# Patient Record
Sex: MALE | Race: OTHER | NOT HISPANIC OR LATINO
[De-identification: names, ages, dates, MRNs, and addresses within clinical notes are randomized per-mention and may not be internally consistent; named-entity substitution may affect disease eponyms.]

---

## 2022-06-23 PROBLEM — Z00.00 ENCOUNTER FOR PREVENTIVE HEALTH EXAMINATION: Status: ACTIVE | Noted: 2022-06-23

## 2022-06-30 ENCOUNTER — NON-APPOINTMENT (OUTPATIENT)
Age: 72
End: 2022-06-30

## 2022-07-06 ENCOUNTER — TRANSCRIPTION ENCOUNTER (OUTPATIENT)
Age: 72
End: 2022-07-06

## 2022-07-06 ENCOUNTER — INPATIENT (INPATIENT)
Facility: HOSPITAL | Age: 72
LOS: 5 days | Discharge: HOME HEALTH | DRG: 233 | End: 2022-07-12
Attending: THORACIC SURGERY (CARDIOTHORACIC VASCULAR SURGERY) | Admitting: THORACIC SURGERY (CARDIOTHORACIC VASCULAR SURGERY)
Payer: COMMERCIAL

## 2022-07-06 VITALS
OXYGEN SATURATION: 98 % | WEIGHT: 158.95 LBS | DIASTOLIC BLOOD PRESSURE: 67 MMHG | HEART RATE: 104 BPM | SYSTOLIC BLOOD PRESSURE: 135 MMHG | HEIGHT: 65 IN | RESPIRATION RATE: 18 BRPM | TEMPERATURE: 98 F

## 2022-07-06 DIAGNOSIS — I25.10 ATHEROSCLEROTIC HEART DISEASE OF NATIVE CORONARY ARTERY WITHOUT ANGINA PECTORIS: ICD-10-CM

## 2022-07-06 DIAGNOSIS — I10 ESSENTIAL (PRIMARY) HYPERTENSION: ICD-10-CM

## 2022-07-06 DIAGNOSIS — E11.9 TYPE 2 DIABETES MELLITUS WITHOUT COMPLICATIONS: ICD-10-CM

## 2022-07-06 DIAGNOSIS — R94.39 ABNORMAL RESULT OF OTHER CARDIOVASCULAR FUNCTION STUDY: ICD-10-CM

## 2022-07-06 LAB
A1C WITH ESTIMATED AVERAGE GLUCOSE RESULT: 6 % — HIGH (ref 4–5.6)
ALBUMIN SERPL ELPH-MCNC: 4.4 G/DL — SIGNIFICANT CHANGE UP (ref 3.3–5)
ALP SERPL-CCNC: 76 U/L — SIGNIFICANT CHANGE UP (ref 40–120)
ALT FLD-CCNC: 22 U/L — SIGNIFICANT CHANGE UP (ref 10–45)
ANION GAP SERPL CALC-SCNC: 10 MMOL/L — SIGNIFICANT CHANGE UP (ref 5–17)
ANION GAP SERPL CALC-SCNC: 11 MMOL/L — SIGNIFICANT CHANGE UP (ref 5–17)
APPEARANCE UR: CLEAR — SIGNIFICANT CHANGE UP
APTT BLD: 119.1 SEC — HIGH (ref 27.5–35.5)
APTT BLD: 92.6 SEC — HIGH (ref 27.5–35.5)
AST SERPL-CCNC: 16 U/L — SIGNIFICANT CHANGE UP (ref 10–40)
BILIRUB DIRECT SERPL-MCNC: <0.1 MG/DL — SIGNIFICANT CHANGE UP (ref 0–0.3)
BILIRUB INDIRECT FLD-MCNC: >0.2 MG/DL — SIGNIFICANT CHANGE UP (ref 0.2–1)
BILIRUB SERPL-MCNC: 0.3 MG/DL — SIGNIFICANT CHANGE UP (ref 0.2–1.2)
BILIRUB UR-MCNC: NEGATIVE — SIGNIFICANT CHANGE UP
BLD GP AB SCN SERPL QL: NEGATIVE — SIGNIFICANT CHANGE UP
BUN SERPL-MCNC: 19 MG/DL — SIGNIFICANT CHANGE UP (ref 7–23)
BUN SERPL-MCNC: 20 MG/DL — SIGNIFICANT CHANGE UP (ref 7–23)
CALCIUM SERPL-MCNC: 8.9 MG/DL — SIGNIFICANT CHANGE UP (ref 8.4–10.5)
CALCIUM SERPL-MCNC: 9.2 MG/DL — SIGNIFICANT CHANGE UP (ref 8.4–10.5)
CHLORIDE SERPL-SCNC: 103 MMOL/L — SIGNIFICANT CHANGE UP (ref 96–108)
CHLORIDE SERPL-SCNC: 103 MMOL/L — SIGNIFICANT CHANGE UP (ref 96–108)
CHOLEST SERPL-MCNC: 129 MG/DL — SIGNIFICANT CHANGE UP
CO2 SERPL-SCNC: 24 MMOL/L — SIGNIFICANT CHANGE UP (ref 22–31)
CO2 SERPL-SCNC: 26 MMOL/L — SIGNIFICANT CHANGE UP (ref 22–31)
COLOR SPEC: SIGNIFICANT CHANGE UP
CREAT SERPL-MCNC: 0.94 MG/DL — SIGNIFICANT CHANGE UP (ref 0.5–1.3)
CREAT SERPL-MCNC: 1.02 MG/DL — SIGNIFICANT CHANGE UP (ref 0.5–1.3)
DIFF PNL FLD: NEGATIVE — SIGNIFICANT CHANGE UP
EGFR: 79 ML/MIN/1.73M2 — SIGNIFICANT CHANGE UP
EGFR: 87 ML/MIN/1.73M2 — SIGNIFICANT CHANGE UP
ESTIMATED AVERAGE GLUCOSE: 126 MG/DL — HIGH (ref 68–114)
FIBRINOGEN PPP-MCNC: 424 MG/DL — SIGNIFICANT CHANGE UP (ref 330–520)
FIBRINOGEN PPP-MCNC: 464 MG/DL — SIGNIFICANT CHANGE UP (ref 330–520)
GLUCOSE BLDC GLUCOMTR-MCNC: 134 MG/DL — HIGH (ref 70–99)
GLUCOSE BLDC GLUCOMTR-MCNC: 92 MG/DL — SIGNIFICANT CHANGE UP (ref 70–99)
GLUCOSE BLDC GLUCOMTR-MCNC: 99 MG/DL — SIGNIFICANT CHANGE UP (ref 70–99)
GLUCOSE SERPL-MCNC: 122 MG/DL — HIGH (ref 70–99)
GLUCOSE SERPL-MCNC: 143 MG/DL — HIGH (ref 70–99)
GLUCOSE UR QL: NEGATIVE — SIGNIFICANT CHANGE UP
HCT VFR BLD CALC: 39.5 % — SIGNIFICANT CHANGE UP (ref 39–50)
HDLC SERPL-MCNC: 42 MG/DL — SIGNIFICANT CHANGE UP
HGB BLD-MCNC: 12.9 G/DL — LOW (ref 13–17)
INR BLD: 1.15 RATIO — SIGNIFICANT CHANGE UP (ref 0.88–1.16)
KETONES UR-MCNC: NEGATIVE — SIGNIFICANT CHANGE UP
LEUKOCYTE ESTERASE UR-ACNC: NEGATIVE — SIGNIFICANT CHANGE UP
LIPID PNL WITH DIRECT LDL SERPL: 80 MG/DL — SIGNIFICANT CHANGE UP
MAGNESIUM SERPL-MCNC: 1.9 MG/DL — SIGNIFICANT CHANGE UP (ref 1.6–2.6)
MCHC RBC-ENTMCNC: 27.2 PG — SIGNIFICANT CHANGE UP (ref 27–34)
MCHC RBC-ENTMCNC: 32.7 GM/DL — SIGNIFICANT CHANGE UP (ref 32–36)
MCV RBC AUTO: 83.3 FL — SIGNIFICANT CHANGE UP (ref 80–100)
NITRITE UR-MCNC: NEGATIVE — SIGNIFICANT CHANGE UP
NON HDL CHOLESTEROL: 86 MG/DL — SIGNIFICANT CHANGE UP
NRBC # BLD: 0 /100 WBCS — SIGNIFICANT CHANGE UP (ref 0–0)
NT-PROBNP SERPL-SCNC: 103 PG/ML — SIGNIFICANT CHANGE UP (ref 0–300)
PA ADP PRP-ACNC: 298 PRU — SIGNIFICANT CHANGE UP (ref 194–417)
PH UR: 6.5 — SIGNIFICANT CHANGE UP (ref 5–8)
PHOSPHATE SERPL-MCNC: 2.9 MG/DL — SIGNIFICANT CHANGE UP (ref 2.5–4.5)
PLATELET # BLD AUTO: 274 K/UL — SIGNIFICANT CHANGE UP (ref 150–400)
POTASSIUM SERPL-MCNC: 4.6 MMOL/L — SIGNIFICANT CHANGE UP (ref 3.5–5.3)
POTASSIUM SERPL-MCNC: 4.7 MMOL/L — SIGNIFICANT CHANGE UP (ref 3.5–5.3)
POTASSIUM SERPL-SCNC: 4.6 MMOL/L — SIGNIFICANT CHANGE UP (ref 3.5–5.3)
POTASSIUM SERPL-SCNC: 4.7 MMOL/L — SIGNIFICANT CHANGE UP (ref 3.5–5.3)
PROT SERPL-MCNC: 7.1 G/DL — SIGNIFICANT CHANGE UP (ref 6–8.3)
PROT UR-MCNC: NEGATIVE — SIGNIFICANT CHANGE UP
PROTHROM AB SERPL-ACNC: 13.3 SEC — SIGNIFICANT CHANGE UP (ref 10.5–13.4)
RBC # BLD: 4.74 M/UL — SIGNIFICANT CHANGE UP (ref 4.2–5.8)
RBC # FLD: 13.2 % — SIGNIFICANT CHANGE UP (ref 10.3–14.5)
RH IG SCN BLD-IMP: POSITIVE — SIGNIFICANT CHANGE UP
RH IG SCN BLD-IMP: POSITIVE — SIGNIFICANT CHANGE UP
SARS-COV-2 RNA SPEC QL NAA+PROBE: SIGNIFICANT CHANGE UP
SODIUM SERPL-SCNC: 138 MMOL/L — SIGNIFICANT CHANGE UP (ref 135–145)
SODIUM SERPL-SCNC: 139 MMOL/L — SIGNIFICANT CHANGE UP (ref 135–145)
SP GR SPEC: 1.02 — SIGNIFICANT CHANGE UP (ref 1.01–1.02)
T3 SERPL-MCNC: 108 NG/DL — SIGNIFICANT CHANGE UP (ref 80–200)
T4 AB SER-ACNC: 8.3 UG/DL — SIGNIFICANT CHANGE UP (ref 4.6–12)
TRIGL SERPL-MCNC: 30 MG/DL — SIGNIFICANT CHANGE UP
TSH SERPL-MCNC: 0.43 UIU/ML — SIGNIFICANT CHANGE UP (ref 0.27–4.2)
UROBILINOGEN FLD QL: NEGATIVE — SIGNIFICANT CHANGE UP
WBC # BLD: 7.26 K/UL — SIGNIFICANT CHANGE UP (ref 3.8–10.5)
WBC # FLD AUTO: 7.26 K/UL — SIGNIFICANT CHANGE UP (ref 3.8–10.5)

## 2022-07-06 PROCEDURE — ZZZZZ: CPT

## 2022-07-06 PROCEDURE — 99152 MOD SED SAME PHYS/QHP 5/>YRS: CPT

## 2022-07-06 PROCEDURE — 93306 TTE W/DOPPLER COMPLETE: CPT | Mod: 26

## 2022-07-06 PROCEDURE — 93010 ELECTROCARDIOGRAM REPORT: CPT

## 2022-07-06 PROCEDURE — 71045 X-RAY EXAM CHEST 1 VIEW: CPT | Mod: 26

## 2022-07-06 PROCEDURE — 93880 EXTRACRANIAL BILAT STUDY: CPT | Mod: 26

## 2022-07-06 PROCEDURE — 93454 CORONARY ARTERY ANGIO S&I: CPT | Mod: 26

## 2022-07-06 RX ORDER — CHLORHEXIDINE GLUCONATE 213 G/1000ML
1 SOLUTION TOPICAL ONCE
Refills: 0 | Status: COMPLETED | OUTPATIENT
Start: 2022-07-06 | End: 2022-07-07

## 2022-07-06 RX ORDER — SIMVASTATIN 20 MG/1
40 TABLET, FILM COATED ORAL AT BEDTIME
Refills: 0 | Status: DISCONTINUED | OUTPATIENT
Start: 2022-07-06 | End: 2022-07-06

## 2022-07-06 RX ORDER — SODIUM CHLORIDE 9 MG/ML
1000 INJECTION, SOLUTION INTRAVENOUS
Refills: 0 | Status: DISCONTINUED | OUTPATIENT
Start: 2022-07-06 | End: 2022-07-07

## 2022-07-06 RX ORDER — SIMVASTATIN 20 MG/1
20 TABLET, FILM COATED ORAL AT BEDTIME
Refills: 0 | Status: DISCONTINUED | OUTPATIENT
Start: 2022-07-06 | End: 2022-07-07

## 2022-07-06 RX ORDER — DEXTROSE 50 % IN WATER 50 %
25 SYRINGE (ML) INTRAVENOUS ONCE
Refills: 0 | Status: DISCONTINUED | OUTPATIENT
Start: 2022-07-06 | End: 2022-07-07

## 2022-07-06 RX ORDER — AMLODIPINE BESYLATE 2.5 MG/1
10 TABLET ORAL DAILY
Refills: 0 | Status: DISCONTINUED | OUTPATIENT
Start: 2022-07-06 | End: 2022-07-07

## 2022-07-06 RX ORDER — CHLORHEXIDINE GLUCONATE 213 G/1000ML
1 SOLUTION TOPICAL ONCE
Refills: 0 | Status: COMPLETED | OUTPATIENT
Start: 2022-07-06 | End: 2022-07-06

## 2022-07-06 RX ORDER — SODIUM CHLORIDE 9 MG/ML
250 INJECTION INTRAMUSCULAR; INTRAVENOUS; SUBCUTANEOUS ONCE
Refills: 0 | Status: COMPLETED | OUTPATIENT
Start: 2022-07-06 | End: 2022-07-06

## 2022-07-06 RX ORDER — INSULIN LISPRO 100/ML
VIAL (ML) SUBCUTANEOUS AT BEDTIME
Refills: 0 | Status: DISCONTINUED | OUTPATIENT
Start: 2022-07-06 | End: 2022-07-07

## 2022-07-06 RX ORDER — GABAPENTIN 400 MG/1
300 CAPSULE ORAL ONCE
Refills: 0 | Status: COMPLETED | OUTPATIENT
Start: 2022-07-06 | End: 2022-07-07

## 2022-07-06 RX ORDER — INSULIN LISPRO 100/ML
VIAL (ML) SUBCUTANEOUS
Refills: 0 | Status: DISCONTINUED | OUTPATIENT
Start: 2022-07-06 | End: 2022-07-07

## 2022-07-06 RX ORDER — SODIUM CHLORIDE 9 MG/ML
1000 INJECTION INTRAMUSCULAR; INTRAVENOUS; SUBCUTANEOUS
Refills: 0 | Status: DISCONTINUED | OUTPATIENT
Start: 2022-07-06 | End: 2022-07-09

## 2022-07-06 RX ORDER — METOPROLOL TARTRATE 50 MG
50 TABLET ORAL EVERY 12 HOURS
Refills: 0 | Status: DISCONTINUED | OUTPATIENT
Start: 2022-07-06 | End: 2022-07-07

## 2022-07-06 RX ORDER — HEPARIN SODIUM 5000 [USP'U]/ML
1000 INJECTION INTRAVENOUS; SUBCUTANEOUS
Qty: 25000 | Refills: 0 | Status: DISCONTINUED | OUTPATIENT
Start: 2022-07-06 | End: 2022-07-07

## 2022-07-06 RX ORDER — ASPIRIN/CALCIUM CARB/MAGNESIUM 324 MG
81 TABLET ORAL DAILY
Refills: 0 | Status: DISCONTINUED | OUTPATIENT
Start: 2022-07-06 | End: 2022-07-07

## 2022-07-06 RX ORDER — DEXTROSE 50 % IN WATER 50 %
15 SYRINGE (ML) INTRAVENOUS ONCE
Refills: 0 | Status: DISCONTINUED | OUTPATIENT
Start: 2022-07-06 | End: 2022-07-07

## 2022-07-06 RX ORDER — ACETAMINOPHEN 500 MG
1000 TABLET ORAL ONCE
Refills: 0 | Status: COMPLETED | OUTPATIENT
Start: 2022-07-06 | End: 2022-07-07

## 2022-07-06 RX ORDER — DEXTROSE 50 % IN WATER 50 %
12.5 SYRINGE (ML) INTRAVENOUS ONCE
Refills: 0 | Status: DISCONTINUED | OUTPATIENT
Start: 2022-07-06 | End: 2022-07-07

## 2022-07-06 RX ORDER — CHLORHEXIDINE GLUCONATE 213 G/1000ML
30 SOLUTION TOPICAL ONCE
Refills: 0 | Status: COMPLETED | OUTPATIENT
Start: 2022-07-06 | End: 2022-07-07

## 2022-07-06 RX ORDER — CEFUROXIME AXETIL 250 MG
1500 TABLET ORAL ONCE
Refills: 0 | Status: DISCONTINUED | OUTPATIENT
Start: 2022-07-06 | End: 2022-07-07

## 2022-07-06 RX ORDER — GLUCAGON INJECTION, SOLUTION 0.5 MG/.1ML
1 INJECTION, SOLUTION SUBCUTANEOUS ONCE
Refills: 0 | Status: DISCONTINUED | OUTPATIENT
Start: 2022-07-06 | End: 2022-07-07

## 2022-07-06 RX ORDER — ASCORBIC ACID 60 MG
2000 TABLET,CHEWABLE ORAL ONCE
Refills: 0 | Status: COMPLETED | OUTPATIENT
Start: 2022-07-06 | End: 2022-07-06

## 2022-07-06 RX ORDER — HEPARIN SODIUM 5000 [USP'U]/ML
5000 INJECTION INTRAVENOUS; SUBCUTANEOUS ONCE
Refills: 0 | Status: COMPLETED | OUTPATIENT
Start: 2022-07-06 | End: 2022-07-06

## 2022-07-06 RX ORDER — MUPIROCIN 20 MG/G
1 OINTMENT TOPICAL
Refills: 0 | Status: DISCONTINUED | OUTPATIENT
Start: 2022-07-06 | End: 2022-07-07

## 2022-07-06 RX ADMIN — CHLORHEXIDINE GLUCONATE 1 APPLICATION(S): 213 SOLUTION TOPICAL at 17:10

## 2022-07-06 RX ADMIN — HEPARIN SODIUM 9 UNIT(S)/HR: 5000 INJECTION INTRAVENOUS; SUBCUTANEOUS at 22:45

## 2022-07-06 RX ADMIN — HEPARIN SODIUM 10 UNIT(S)/HR: 5000 INJECTION INTRAVENOUS; SUBCUTANEOUS at 16:18

## 2022-07-06 RX ADMIN — HEPARIN SODIUM 5000 UNIT(S): 5000 INJECTION INTRAVENOUS; SUBCUTANEOUS at 16:19

## 2022-07-06 RX ADMIN — Medication 50 MILLIGRAM(S): at 16:19

## 2022-07-06 RX ADMIN — SODIUM CHLORIDE 75 MILLILITER(S): 9 INJECTION INTRAMUSCULAR; INTRAVENOUS; SUBCUTANEOUS at 10:10

## 2022-07-06 RX ADMIN — SODIUM CHLORIDE 750 MILLILITER(S): 9 INJECTION INTRAMUSCULAR; INTRAVENOUS; SUBCUTANEOUS at 08:40

## 2022-07-06 RX ADMIN — MUPIROCIN 1 APPLICATION(S): 20 OINTMENT TOPICAL at 16:19

## 2022-07-06 RX ADMIN — Medication 2000 MILLIGRAM(S): at 21:29

## 2022-07-06 RX ADMIN — Medication 81 MILLIGRAM(S): at 16:19

## 2022-07-06 RX ADMIN — SIMVASTATIN 20 MILLIGRAM(S): 20 TABLET, FILM COATED ORAL at 21:29

## 2022-07-06 NOTE — CONSULT NOTE ADULT - ASSESSMENT
This is a 72 yo male PMH DM2 A1C 5.7, former smoker 30 pack yrs, bilat carotid stenosis,  HTN, HLD who presented to cardiology, Dr. BAUDILIO Briceno, for evaluation of left shoulder pain.  Denies chest pain/pressure, SOB/FONSECA, dizziness, diaphoresis, palpitations, nausea, vomiting, peripheral edema, recent weight gain, or syncope.  No implanted monitoring devices. NST 1/8/2021 EF 61% septal and apical moderate perfusion defect, moderate inferior defect.  Pt. presents asymptomatic for further evaluation and cardiac cath.  (06 Jul 2022 08:20)  Cardiac cath done showing Multivessel disease; CTS Dr Montgomery consulted for CABG eval.     7/6: Pt seen an examined at bedside, all labs and imaging reviewed by Dr. Montgomery; Plan for CABG tomorrow in AM 7/7, preop workup in progress

## 2022-07-06 NOTE — H&P CARDIOLOGY - NSICDXPASTMEDICALHX_GEN_ALL_CORE_FT
PAST MEDICAL HISTORY:  Diabetes mellitus     Former smoker     HLD (hyperlipidemia)     HTN (hypertension)     Vitiligo

## 2022-07-06 NOTE — CONSULT NOTE ADULT - PROBLEM SELECTOR RECOMMENDATION 9
CTS Dr. Montgomery Consulted for CABG eval  Dr. Montgomery to see and evaluate patient   c/w ASA 81mg, simvastatin 20mg @bedtime, Start BB  Preop w/u in progress- preop labs ordered, Carotids, PFTs, TFTs, TTE  Lisinopril Held for OR tomorrow   OR Date: Tomorrow 7/7  NPO after midnight   Labs and imaging reviewed with Dr. Montgomery

## 2022-07-06 NOTE — CONSULT NOTE ADULT - SUBJECTIVE AND OBJECTIVE BOX
History of Present Illness:  HPI:  This is a 70 yo male PMH DM2 A1C 5.7, former smoker 30 pack yrs, bilat carotid stenosis,  HTN, HLD who presented to cardiology, Dr. BAUDILIO Briceno, for evaluation of left shoulder pain.  Denies chest pain/pressure, SOB/FONSECA, dizziness, diaphoresis, palpitations, nausea, vomiting, peripheral edema, recent weight gain, or syncope.  No implanted monitoring devices. NST 2021 EF 61% septal and apical moderate perfusion defect, moderate inferior defect.  Pt. presents asymptomatic for further evaluation and cardiac cath.  (2022 08:20)   Cardiac cath done showing Multivessel disease; CTS Dr Montgomery consulted for CABG eval.        Past Medical History  HTN (hypertension)    HLD (hyperlipidemia)    Diabetes mellitus    Former smoker    Vitiligo    Past Surgical History  No significant past surgical history    MEDICATIONS  (STANDING):  amLODIPine   Tablet 10 milliGRAM(s) Oral daily  aspirin enteric coated 81 milliGRAM(s) Oral daily  chlorhexidine 4% Liquid 1 Application(s) Topical once  dextrose 5%. 1000 milliLiter(s) (50 mL/Hr) IV Continuous <Continuous>  dextrose 5%. 1000 milliLiter(s) (100 mL/Hr) IV Continuous <Continuous>  dextrose 50% Injectable 25 Gram(s) IV Push once  dextrose 50% Injectable 12.5 Gram(s) IV Push once  dextrose 50% Injectable 25 Gram(s) IV Push once  glucagon  Injectable 1 milliGRAM(s) IntraMuscular once  insulin lispro (ADMELOG) corrective regimen sliding scale   SubCutaneous three times a day before meals  insulin lispro (ADMELOG) corrective regimen sliding scale   SubCutaneous at bedtime  simvastatin 20 milliGRAM(s) Oral at bedtime  sodium chloride 0.9%. 1000 milliLiter(s) (75 mL/Hr) IV Continuous <Continuous>    MEDICATIONS  (PRN):  dextrose Oral Gel 15 Gram(s) Oral once PRN Blood Glucose LESS THAN 70 milliGRAM(s)/deciliter      Vital Signs Last 24 Hrs  T(C): 36.6 (22 @ 08:20), Max: 36.6 (22 @ 08:04)  T(F): 97.8 (22 @ 08:20), Max: 97.8 (22 @ 08:04)  HR: 89 (22 @ 12:45) (88 - 107)  BP: 109/59 (22 @ 12:45) (106/58 - 135/67)  RR: 18 (22 @ 12:45) (16 - 18)  SpO2: 99% (22 @ 12:45) (94% - 99%)           Daily Height in cm: 165.1 (2022 08:20)    Daily Weight in k.1 (2022 08:20)  Admit Wt: Drug Dosing Weight  Height (cm): 165.1 (2022 08:20)  Weight (kg): 72.1 (2022 08:20)  BMI (kg/m2): 26.5 (2022 08:20)  BSA (m2): 1.79 (2022 08:20)    Allergies: No Known Allergies      SOCIAL HISTORY:  Smoker: [ x ] Yes  [] No                 Pt admits to smoking for 25-30 years, states he quit 15 years ago  ETOH use: [ x ] Yes  [X] No             Pt admits to socially drinking on weekends  Ilicit Drug use:  [ ] Yes  [X] No     Pt denies    FAMILY HISTORY:  No pertinent family history in first degree relatives      Review of Systems  GENERAL:  no weakness, fatigue, fevers or chills  NEURO: no dizziness, numbness, tingling or weakness  SKIN: no itching, burning, rashes, or lesions   HEENT: no visual changes;  no headache, no vertigo, no recent colds  RESPIRATORY: no shortness of breath, no cough, sputum, wheezing  CARDIOVASCULAR: + L shoulder pain, no chest pain,  or palpitations  GI: no abd pain. no N/V/D.  : + testicular swelling   PERIPHERAL VASCULAR: no swelling, no tenderness, no erythema    PHYSICAL EXAM  General: Well nourished, well developed, NAD.                                              Neuro: Normal exam oriented to person/place & time with no focal motor or sensory  deficits.                    Neck: Normal exam of jugular veins, trachea & thyroid.   Chest: Normal lung exam with good air movement absence of wheezes, rales, or rhonchi.                                                                         CV: Auscultation: normal S1S2, RRR   Carotids: No Bruits[X]  Abdominal Aorta: normal [X] nonpalpable[X]                                                                         GI: Normal exam of abdomen with no noted masses or tenderness. +BSx4Q                                                                                            Extremities: Normal no evidence of cyanosis or deformity, Edema: none  Lower Extremity Pulses: Right[+2DP] Left[+2DP] Varicosities[none]  SKIN : Normal exam to inspection & palpation.                                                           LABS:                   12.9   7.26  )-----------( 274      ( 2022 08:23 )             39.5     07-06    138  |  103  |  19  ----------------------------<  122<H>  4.7   |  24  |  0.94    Ca    8.9      2022 10:28  Phos  2.9     07-06  Mg     1.9     07-06    TPro  7.1  /  Alb  4.4  /  TBili  0.3  /  DBili  <0.1  /  AST  16  /  ALT  22  /  AlkPhos  76  07-06    PT/INR - ( 2022 10:28 )   PT: 13.3 sec;   INR: 1.15 ratio         PTT - ( 2022 10:28 )  PTT:92.6 sec    Cardiac Cath:   < from: Cardiac Catheterization (22 @ 09:14) >  Coronary Angiography   The coronary circulation is left dominant.      LM   Distal left main: There is a 70 % stenosis.      LAD   Ostial left anterior descending: There is a 99 % stenosis. First  diagonal: There is an 80 % stenosis.    CX   Circumflex: Angiography shows mild atherosclerosis.      RCA   Right coronary artery: Angiography shows moderate atherosclerosis.      Patient: FREDIS OJEDA           MRN: [id number]  Study Date: 2022   09:14 AM      Page 1 of 3    < end of copied text >      TTE / STEVE: Pending

## 2022-07-06 NOTE — H&P CARDIOLOGY - HISTORY OF PRESENT ILLNESS
This is a 72 yo male PMH DM2 A1C 5.7, former smoker 30 pack yrs, bilat carotid stenosis,  HTN, HLD who presented to cardiology, Dr. BAUDILIO Briceno, for evaluation of left shoulder pain.  Denies chest pain/pressure, SOB/FONSECA, dizziness, diaphoresis, palpitations, nausea, vomiting, peripheral edema, recent weight gain, or syncope.  No implanted monitoring devices. NST 1/8/2021 EF 61% septal and apical moderate perfusion defect, moderate inferior defect.  Pt. presents asymptomatic for further evaluation and cardiac cath.

## 2022-07-06 NOTE — PROGRESS NOTE ADULT - SUBJECTIVE AND OBJECTIVE BOX
Cardiac Surgery Pre-op Note:    Surgeon: Dr. Montgomery     Procedure: (Date) (Procedure)    HPI:  This is a 72 yo male PMH DM2 A1C 5.7, former smoker 30 pack yrs, bilat carotid stenosis,  HTN, HLD who presented to cardiology, Dr. BAUDILIO Briceno, for evaluation of left shoulder pain.  Denies chest pain/pressure, SOB/FONSECA, dizziness, diaphoresis, palpitations, nausea, vomiting, peripheral edema, recent weight gain, or syncope.  No implanted monitoring devices. NST 2021 EF 61% septal and apical moderate perfusion defect, moderate inferior defect.  Pt. presents asymptomatic for further evaluation and cardiac cath.  (2022 08:20)   Cardiac cath done showing Multivessel disease; CTS Dr Montgomery consulted for CABG eval.       PAST MEDICAL & SURGICAL HISTORY:  HTN (hypertension)      HLD (hyperlipidemia)      Diabetes mellitus      Former smoker      Vitiligo      No significant past surgical history    MEDICATIONS  (STANDING):  amLODIPine   Tablet 10 milliGRAM(s) Oral daily  aspirin enteric coated 81 milliGRAM(s) Oral daily  chlorhexidine 4% Liquid 1 Application(s) Topical once  dextrose 5%. 1000 milliLiter(s) (50 mL/Hr) IV Continuous <Continuous>  dextrose 5%. 1000 milliLiter(s) (100 mL/Hr) IV Continuous <Continuous>  dextrose 50% Injectable 25 Gram(s) IV Push once  dextrose 50% Injectable 12.5 Gram(s) IV Push once  dextrose 50% Injectable 25 Gram(s) IV Push once  glucagon  Injectable 1 milliGRAM(s) IntraMuscular once  insulin lispro (ADMELOG) corrective regimen sliding scale   SubCutaneous three times a day before meals  insulin lispro (ADMELOG) corrective regimen sliding scale   SubCutaneous at bedtime  simvastatin 20 milliGRAM(s) Oral at bedtime  sodium chloride 0.9%. 1000 milliLiter(s) (75 mL/Hr) IV Continuous <Continuous>    MEDICATIONS  (PRN):  dextrose Oral Gel 15 Gram(s) Oral once PRN Blood Glucose LESS THAN 70 milliGRAM(s)/deciliter    Vital Signs Last 24 Hrs  T(C): 36.6 (22 @ 08:20), Max: 36.6 (22 @ 08:04)  T(F): 97.8 (22 @ 08:20), Max: 97.8 (22 @ 08:04)  HR: 89 (22 @ 12:45) (88 - 107)  BP: 109/59 (22 @ 12:45) (106/58 - 135/67)  RR: 18 (22 @ 12:45) (16 - 18)  SpO2: 99% (22 @ 12:45) (94% - 99%)         ABO Interpretation: O ( @ 10:34)     Daily Height in cm: 165.1 (2022 08:20)    Daily Weight in k.1 (2022 08:20)  Admit Wt: Drug Dosing Weight  Height (cm): 165.1 (2022 08:20)  Weight (kg): 72.1 (2022 08:20)  BMI (kg/m2): 26.5 (2022 08:20)  BSA (m2): 1.79 (2022 08:20)    Labs:                        12.9   7.26  )-----------( 274      ( 2022 08:23 )             39.5     07-06    138  |  103  |  19  ----------------------------<  122<H>  4.7   |  24  |  0.94    Ca    8.9      2022 10:28  Phos  2.9     07-06  Mg     1.9     07-06    TPro  7.1  /  Alb  4.4  /  TBili  0.3  /  DBili  <0.1  /  AST  16  /  ALT  22  /  AlkPhos  76  07-06    PT/INR - ( 2022 10:28 )   PT: 13.3 sec;   INR: 1.15 ratio         PTT - ( 2022 10:28 )  PTT:92.6 sec    ABO Interpretation: O ( @ 10:34)    Serum Pro-Brain Natriuretic Peptide: 103 pg/mL ( @ 10:28)    Thyroid Panel: pending  MRSA:  / MSSA: pending     P2Y12: pending     CXR: pending    Carotid Duplex:  pending    PFT's: pending     Echocardiogram: pending    Cardiac catheterization: < from: Cardiac Catheterization (22 @ 09:14) >  Diagnostic Findings:     Coronary Angiography   The coronary circulation is left dominant.      LM   Distal left main: There is a 70 % stenosis.      LAD   Ostial left anterior descending: There is a 99 % stenosis. First  diagonal: There is an 80 % stenosis.    CX   Circumflex: Angiography shows mild atherosclerosis.      RCA   Right coronary artery: Angiography shows moderate atherosclerosis.      Patient: FREDIS OJEDA           MRN: [id number]  Study Date: 2022   09:14 AM      Page 1 of 3    < end of copied text >    PHYSICAL EXAM:  Neuro: A&Ox3, NAD  Pulm: B/L BS CTA  CV: RRR,+S1S2  Abd: Soft, NT/ND +BSX4Q  : + Testicular edema  Ext: B/L LE no edema, +PP B/L, no calf tenderness, groins stable     Pt has AICD/PPM [ ] Yes  [x ] No             Brand Name:  Pre-op Beta Blocker ordered within 24 hrs of surgery (CABG ONLY)?  [x ] Yes  [ ] No  If not, Why?  Type & Cross  [x ] Yes  [ ] No      - Ordered  NPO after Midnight [x ] Yes  [ ] No  Pre-op ABX ordered, to be taped on chart:  [ x] Yes  [ ] No     Hibiclens/Peridex ordered [x ] Yes  [ ] No  Intraop on Hold: PRBCs, CXR, STEVE [x ]   Consent obtained  [ ] Yes  [ ] No      placed in chart

## 2022-07-07 ENCOUNTER — APPOINTMENT (OUTPATIENT)
Dept: CARDIOTHORACIC SURGERY | Facility: HOSPITAL | Age: 72
End: 2022-07-07

## 2022-07-07 ENCOUNTER — TRANSCRIPTION ENCOUNTER (OUTPATIENT)
Age: 72
End: 2022-07-07

## 2022-07-07 LAB
A1C WITH ESTIMATED AVERAGE GLUCOSE RESULT: 5.9 % — HIGH (ref 4–5.6)
ALBUMIN SERPL ELPH-MCNC: 3.8 G/DL — SIGNIFICANT CHANGE UP (ref 3.3–5)
ALP SERPL-CCNC: 52 U/L — SIGNIFICANT CHANGE UP (ref 40–120)
ALT FLD-CCNC: 23 U/L — SIGNIFICANT CHANGE UP (ref 10–45)
ANION GAP SERPL CALC-SCNC: 17 MMOL/L — SIGNIFICANT CHANGE UP (ref 5–17)
ANISOCYTOSIS BLD QL: SLIGHT — SIGNIFICANT CHANGE UP
APTT BLD: 22.8 SEC — LOW (ref 27.5–35.5)
APTT BLD: 28.9 SEC — SIGNIFICANT CHANGE UP (ref 27.5–35.5)
AST SERPL-CCNC: 50 U/L — HIGH (ref 10–40)
BASE EXCESS BLDV CALC-SCNC: -0.5 MMOL/L — SIGNIFICANT CHANGE UP (ref -2–2)
BASE EXCESS BLDV CALC-SCNC: -1.1 MMOL/L — SIGNIFICANT CHANGE UP (ref -2–2)
BASE EXCESS BLDV CALC-SCNC: -1.7 MMOL/L — SIGNIFICANT CHANGE UP (ref -2–2)
BASE EXCESS BLDV CALC-SCNC: -3.1 MMOL/L — LOW (ref -2–2)
BASE EXCESS BLDV CALC-SCNC: -3.4 MMOL/L — LOW (ref -2–2)
BASE EXCESS BLDV CALC-SCNC: -5.1 MMOL/L — LOW (ref -2–2)
BASE EXCESS BLDV CALC-SCNC: 1.5 MMOL/L — SIGNIFICANT CHANGE UP (ref -2–2)
BASE EXCESS BLDV CALC-SCNC: 1.6 MMOL/L — SIGNIFICANT CHANGE UP (ref -2–2)
BASE EXCESS BLDV CALC-SCNC: 2.6 MMOL/L — HIGH (ref -2–2)
BASOPHILS # BLD AUTO: 0.03 K/UL — SIGNIFICANT CHANGE UP (ref 0–0.2)
BASOPHILS NFR BLD AUTO: 0.3 % — SIGNIFICANT CHANGE UP (ref 0–2)
BILIRUB SERPL-MCNC: 0.9 MG/DL — SIGNIFICANT CHANGE UP (ref 0.2–1.2)
BLOOD GAS VENOUS - CREATININE: SIGNIFICANT CHANGE UP MG/DL (ref 0.5–1.3)
BUN SERPL-MCNC: 16 MG/DL — SIGNIFICANT CHANGE UP (ref 7–23)
CA-I SERPL-SCNC: 0.73 MMOL/L — LOW (ref 1.15–1.33)
CA-I SERPL-SCNC: 0.78 MMOL/L — LOW (ref 1.15–1.33)
CA-I SERPL-SCNC: 0.82 MMOL/L — LOW (ref 1.15–1.33)
CA-I SERPL-SCNC: 0.84 MMOL/L — LOW (ref 1.15–1.33)
CA-I SERPL-SCNC: 0.91 MMOL/L — LOW (ref 1.15–1.33)
CA-I SERPL-SCNC: 1.13 MMOL/L — LOW (ref 1.15–1.33)
CA-I SERPL-SCNC: 1.19 MMOL/L — SIGNIFICANT CHANGE UP (ref 1.15–1.33)
CALCIUM SERPL-MCNC: 8.8 MG/DL — SIGNIFICANT CHANGE UP (ref 8.4–10.5)
CHLORIDE BLDV-SCNC: 103 MMOL/L — SIGNIFICANT CHANGE UP (ref 96–108)
CHLORIDE BLDV-SCNC: 106 MMOL/L — SIGNIFICANT CHANGE UP (ref 96–108)
CHLORIDE BLDV-SCNC: 107 MMOL/L — SIGNIFICANT CHANGE UP (ref 96–108)
CHLORIDE BLDV-SCNC: 108 MMOL/L — SIGNIFICANT CHANGE UP (ref 96–108)
CHLORIDE BLDV-SCNC: 109 MMOL/L — HIGH (ref 96–108)
CHLORIDE SERPL-SCNC: 107 MMOL/L — SIGNIFICANT CHANGE UP (ref 96–108)
CK MB BLD-MCNC: 3.7 % — HIGH (ref 0–3.5)
CK MB BLD-MCNC: 8.4 % — HIGH (ref 0–3.5)
CK MB CFR SERPL CALC: 29.1 NG/ML — HIGH (ref 0–6.7)
CK MB CFR SERPL CALC: 47.7 NG/ML — HIGH (ref 0–6.7)
CK SERPL-CCNC: 571 U/L — HIGH (ref 30–200)
CK SERPL-CCNC: 786 U/L — HIGH (ref 30–200)
CO2 BLDV-SCNC: 23 MMOL/L — SIGNIFICANT CHANGE UP (ref 22–26)
CO2 BLDV-SCNC: 23 MMOL/L — SIGNIFICANT CHANGE UP (ref 22–26)
CO2 BLDV-SCNC: 24 MMOL/L — SIGNIFICANT CHANGE UP (ref 22–26)
CO2 BLDV-SCNC: 26 MMOL/L — SIGNIFICANT CHANGE UP (ref 22–26)
CO2 BLDV-SCNC: 26 MMOL/L — SIGNIFICANT CHANGE UP (ref 22–26)
CO2 BLDV-SCNC: 27 MMOL/L — HIGH (ref 22–26)
CO2 BLDV-SCNC: 28 MMOL/L — HIGH (ref 22–26)
CO2 BLDV-SCNC: 28 MMOL/L — HIGH (ref 22–26)
CO2 BLDV-SCNC: 29 MMOL/L — HIGH (ref 22–26)
CO2 SERPL-SCNC: 24 MMOL/L — SIGNIFICANT CHANGE UP (ref 22–31)
CREAT SERPL-MCNC: 0.91 MG/DL — SIGNIFICANT CHANGE UP (ref 0.5–1.3)
DACRYOCYTES BLD QL SMEAR: SLIGHT — SIGNIFICANT CHANGE UP
EGFR: 91 ML/MIN/1.73M2 — SIGNIFICANT CHANGE UP
ELLIPTOCYTES BLD QL SMEAR: SLIGHT — SIGNIFICANT CHANGE UP
EOSINOPHIL # BLD AUTO: 0.05 K/UL — SIGNIFICANT CHANGE UP (ref 0–0.5)
EOSINOPHIL NFR BLD AUTO: 0.5 % — SIGNIFICANT CHANGE UP (ref 0–6)
ESTIMATED AVERAGE GLUCOSE: 123 MG/DL — HIGH (ref 68–114)
FIBRINOGEN PPP-MCNC: 362 MG/DL — SIGNIFICANT CHANGE UP (ref 330–520)
FIBRINOGEN PPP-MCNC: 397 MG/DL — SIGNIFICANT CHANGE UP (ref 330–520)
GAS PNL BLDA: SIGNIFICANT CHANGE UP
GAS PNL BLDV: 134 MMOL/L — LOW (ref 136–145)
GAS PNL BLDV: 138 MMOL/L — SIGNIFICANT CHANGE UP (ref 136–145)
GAS PNL BLDV: 138 MMOL/L — SIGNIFICANT CHANGE UP (ref 136–145)
GAS PNL BLDV: 140 MMOL/L — SIGNIFICANT CHANGE UP (ref 136–145)
GAS PNL BLDV: 141 MMOL/L — SIGNIFICANT CHANGE UP (ref 136–145)
GAS PNL BLDV: 141 MMOL/L — SIGNIFICANT CHANGE UP (ref 136–145)
GAS PNL BLDV: 143 MMOL/L — SIGNIFICANT CHANGE UP (ref 136–145)
GAS PNL BLDV: SIGNIFICANT CHANGE UP
GLUCOSE BLDC GLUCOMTR-MCNC: 126 MG/DL — HIGH (ref 70–99)
GLUCOSE BLDC GLUCOMTR-MCNC: 134 MG/DL — HIGH (ref 70–99)
GLUCOSE BLDC GLUCOMTR-MCNC: 146 MG/DL — HIGH (ref 70–99)
GLUCOSE BLDC GLUCOMTR-MCNC: 156 MG/DL — HIGH (ref 70–99)
GLUCOSE BLDC GLUCOMTR-MCNC: 167 MG/DL — HIGH (ref 70–99)
GLUCOSE BLDV-MCNC: 130 MG/DL — HIGH (ref 70–99)
GLUCOSE BLDV-MCNC: 131 MG/DL — HIGH (ref 70–99)
GLUCOSE BLDV-MCNC: 135 MG/DL — HIGH (ref 70–99)
GLUCOSE BLDV-MCNC: 136 MG/DL — HIGH (ref 70–99)
GLUCOSE BLDV-MCNC: 138 MG/DL — HIGH (ref 70–99)
GLUCOSE BLDV-MCNC: 148 MG/DL — HIGH (ref 70–99)
GLUCOSE BLDV-MCNC: 149 MG/DL — HIGH (ref 70–99)
GLUCOSE SERPL-MCNC: 134 MG/DL — HIGH (ref 70–99)
HCO3 BLDV-SCNC: 21 MMOL/L — LOW (ref 22–29)
HCO3 BLDV-SCNC: 22 MMOL/L — SIGNIFICANT CHANGE UP (ref 22–29)
HCO3 BLDV-SCNC: 23 MMOL/L — SIGNIFICANT CHANGE UP (ref 22–29)
HCO3 BLDV-SCNC: 24 MMOL/L — SIGNIFICANT CHANGE UP (ref 22–29)
HCO3 BLDV-SCNC: 24 MMOL/L — SIGNIFICANT CHANGE UP (ref 22–29)
HCO3 BLDV-SCNC: 25 MMOL/L — SIGNIFICANT CHANGE UP (ref 22–29)
HCO3 BLDV-SCNC: 27 MMOL/L — SIGNIFICANT CHANGE UP (ref 22–29)
HCT VFR BLD CALC: 24.5 % — LOW (ref 39–50)
HCT VFR BLD CALC: 27.2 % — LOW (ref 39–50)
HCT VFR BLDA CALC: 23 % — LOW (ref 39–51)
HCT VFR BLDA CALC: 24 % — LOW (ref 39–51)
HCT VFR BLDA CALC: 25 % — LOW (ref 39–51)
HCT VFR BLDA CALC: 26 % — LOW (ref 39–51)
HCT VFR BLDA CALC: 26 % — LOW (ref 39–51)
HGB BLD CALC-MCNC: 7.5 G/DL — LOW (ref 12.6–17.4)
HGB BLD CALC-MCNC: 8.1 G/DL — LOW (ref 12.6–17.4)
HGB BLD CALC-MCNC: 8.2 G/DL — LOW (ref 12.6–17.4)
HGB BLD CALC-MCNC: 8.2 G/DL — LOW (ref 12.6–17.4)
HGB BLD CALC-MCNC: 8.4 G/DL — LOW (ref 12.6–17.4)
HGB BLD CALC-MCNC: 8.6 G/DL — LOW (ref 12.6–17.4)
HGB BLD CALC-MCNC: 8.7 G/DL — LOW (ref 12.6–17.4)
HGB BLD-MCNC: 8.3 G/DL — LOW (ref 13–17)
HGB BLD-MCNC: 9.1 G/DL — LOW (ref 13–17)
HOROWITZ INDEX BLDV+IHG-RTO: 100 — SIGNIFICANT CHANGE UP
HOROWITZ INDEX BLDV+IHG-RTO: 50 — SIGNIFICANT CHANGE UP
IMM GRANULOCYTES NFR BLD AUTO: 0.8 % — SIGNIFICANT CHANGE UP (ref 0–1.5)
INR BLD: 1.32 RATIO — HIGH (ref 0.88–1.16)
INR BLD: 1.54 RATIO — HIGH (ref 0.88–1.16)
LACTATE BLDV-MCNC: 1.4 MMOL/L — SIGNIFICANT CHANGE UP (ref 0.7–2)
LACTATE BLDV-MCNC: 1.6 MMOL/L — SIGNIFICANT CHANGE UP (ref 0.7–2)
LACTATE BLDV-MCNC: 1.8 MMOL/L — SIGNIFICANT CHANGE UP (ref 0.7–2)
LACTATE BLDV-MCNC: 1.8 MMOL/L — SIGNIFICANT CHANGE UP (ref 0.7–2)
LACTATE BLDV-MCNC: 2 MMOL/L — SIGNIFICANT CHANGE UP (ref 0.7–2)
LACTATE BLDV-MCNC: 2.8 MMOL/L — HIGH (ref 0.7–2)
LACTATE BLDV-MCNC: 3.4 MMOL/L — HIGH (ref 0.7–2)
LYMPHOCYTES # BLD AUTO: 1.36 K/UL — SIGNIFICANT CHANGE UP (ref 1–3.3)
LYMPHOCYTES # BLD AUTO: 12.7 % — LOW (ref 13–44)
MACROCYTES BLD QL: SLIGHT — SIGNIFICANT CHANGE UP
MANUAL SMEAR VERIFICATION: SIGNIFICANT CHANGE UP
MCHC RBC-ENTMCNC: 28.1 PG — SIGNIFICANT CHANGE UP (ref 27–34)
MCHC RBC-ENTMCNC: 28.3 PG — SIGNIFICANT CHANGE UP (ref 27–34)
MCHC RBC-ENTMCNC: 33.5 GM/DL — SIGNIFICANT CHANGE UP (ref 32–36)
MCHC RBC-ENTMCNC: 33.9 GM/DL — SIGNIFICANT CHANGE UP (ref 32–36)
MCV RBC AUTO: 83.1 FL — SIGNIFICANT CHANGE UP (ref 80–100)
MCV RBC AUTO: 84.5 FL — SIGNIFICANT CHANGE UP (ref 80–100)
MICROCYTES BLD QL: SLIGHT — SIGNIFICANT CHANGE UP
MONOCYTES # BLD AUTO: 0.74 K/UL — SIGNIFICANT CHANGE UP (ref 0–0.9)
MONOCYTES NFR BLD AUTO: 6.9 % — SIGNIFICANT CHANGE UP (ref 2–14)
MRSA PCR RESULT.: SIGNIFICANT CHANGE UP
NEUTROPHILS # BLD AUTO: 8.46 K/UL — HIGH (ref 1.8–7.4)
NEUTROPHILS NFR BLD AUTO: 78.8 % — HIGH (ref 43–77)
NRBC # BLD: 0 /100 WBCS — SIGNIFICANT CHANGE UP (ref 0–0)
NRBC # BLD: 0 /100 WBCS — SIGNIFICANT CHANGE UP (ref 0–0)
PCO2 BLDV: 41 MMHG — LOW (ref 42–55)
PCO2 BLDV: 41 MMHG — LOW (ref 42–55)
PCO2 BLDV: 43 MMHG — SIGNIFICANT CHANGE UP (ref 42–55)
PCO2 BLDV: 44 MMHG — SIGNIFICANT CHANGE UP (ref 42–55)
PCO2 BLDV: 46 MMHG — SIGNIFICANT CHANGE UP (ref 42–55)
PCO2 BLDV: 46 MMHG — SIGNIFICANT CHANGE UP (ref 42–55)
PCO2 BLDV: 47 MMHG — SIGNIFICANT CHANGE UP (ref 42–55)
PH BLDV: 7.29 — LOW (ref 7.32–7.43)
PH BLDV: 7.33 — SIGNIFICANT CHANGE UP (ref 7.32–7.43)
PH BLDV: 7.33 — SIGNIFICANT CHANGE UP (ref 7.32–7.43)
PH BLDV: 7.34 — SIGNIFICANT CHANGE UP (ref 7.32–7.43)
PH BLDV: 7.34 — SIGNIFICANT CHANGE UP (ref 7.32–7.43)
PH BLDV: 7.36 — SIGNIFICANT CHANGE UP (ref 7.32–7.43)
PH BLDV: 7.38 — SIGNIFICANT CHANGE UP (ref 7.32–7.43)
PH BLDV: 7.4 — SIGNIFICANT CHANGE UP (ref 7.32–7.43)
PH BLDV: 7.43 — SIGNIFICANT CHANGE UP (ref 7.32–7.43)
PLAT MORPH BLD: NORMAL — SIGNIFICANT CHANGE UP
PLATELET # BLD AUTO: 141 K/UL — LOW (ref 150–400)
PLATELET # BLD AUTO: 181 K/UL — SIGNIFICANT CHANGE UP (ref 150–400)
PO2 BLDV: 148 MMHG — HIGH (ref 25–45)
PO2 BLDV: 162 MMHG — HIGH (ref 25–45)
PO2 BLDV: 41 MMHG — SIGNIFICANT CHANGE UP (ref 25–45)
PO2 BLDV: 45 MMHG — SIGNIFICANT CHANGE UP (ref 25–45)
PO2 BLDV: 45 MMHG — SIGNIFICANT CHANGE UP (ref 25–45)
PO2 BLDV: 67 MMHG — HIGH (ref 25–45)
PO2 BLDV: 70 MMHG — HIGH (ref 25–45)
PO2 BLDV: 91 MMHG — HIGH (ref 25–45)
PO2 BLDV: 93 MMHG — HIGH (ref 25–45)
POLYCHROMASIA BLD QL SMEAR: SLIGHT — SIGNIFICANT CHANGE UP
POTASSIUM BLDV-SCNC: 4.1 MMOL/L — SIGNIFICANT CHANGE UP (ref 3.5–5.1)
POTASSIUM BLDV-SCNC: 4.2 MMOL/L — SIGNIFICANT CHANGE UP (ref 3.5–5.1)
POTASSIUM BLDV-SCNC: 4.7 MMOL/L — SIGNIFICANT CHANGE UP (ref 3.5–5.1)
POTASSIUM BLDV-SCNC: 5.2 MMOL/L — HIGH (ref 3.5–5.1)
POTASSIUM BLDV-SCNC: 5.3 MMOL/L — HIGH (ref 3.5–5.1)
POTASSIUM BLDV-SCNC: 6.1 MMOL/L — HIGH (ref 3.5–5.1)
POTASSIUM BLDV-SCNC: 6.6 MMOL/L — CRITICAL HIGH (ref 3.5–5.1)
POTASSIUM SERPL-MCNC: 4.3 MMOL/L — SIGNIFICANT CHANGE UP (ref 3.5–5.3)
POTASSIUM SERPL-SCNC: 4.3 MMOL/L — SIGNIFICANT CHANGE UP (ref 3.5–5.3)
PROT SERPL-MCNC: 5.5 G/DL — LOW (ref 6–8.3)
PROTHROM AB SERPL-ACNC: 15.4 SEC — HIGH (ref 10.5–13.4)
PROTHROM AB SERPL-ACNC: 17.9 SEC — HIGH (ref 10.5–13.4)
RBC # BLD: 2.95 M/UL — LOW (ref 4.2–5.8)
RBC # BLD: 3.22 M/UL — LOW (ref 4.2–5.8)
RBC # FLD: 12.9 % — SIGNIFICANT CHANGE UP (ref 10.3–14.5)
RBC # FLD: 13.2 % — SIGNIFICANT CHANGE UP (ref 10.3–14.5)
RBC BLD AUTO: ABNORMAL
S AUREUS DNA NOSE QL NAA+PROBE: SIGNIFICANT CHANGE UP
SAO2 % BLDV: 65.8 % — LOW (ref 67–88)
SAO2 % BLDV: 76.6 % — SIGNIFICANT CHANGE UP (ref 67–88)
SAO2 % BLDV: 78.2 % — SIGNIFICANT CHANGE UP (ref 67–88)
SAO2 % BLDV: 91 % — HIGH (ref 67–88)
SAO2 % BLDV: 91.2 % — HIGH (ref 67–88)
SAO2 % BLDV: 94.5 % — HIGH (ref 67–88)
SAO2 % BLDV: 96.5 % — HIGH (ref 67–88)
SAO2 % BLDV: 98 % — HIGH (ref 67–88)
SAO2 % BLDV: 98.1 % — HIGH (ref 67–88)
SMUDGE CELLS # BLD: PRESENT — SIGNIFICANT CHANGE UP
SODIUM SERPL-SCNC: 148 MMOL/L — HIGH (ref 135–145)
T3 SERPL-MCNC: 123 NG/DL — SIGNIFICANT CHANGE UP (ref 80–200)
T4 AB SER-ACNC: 8.3 UG/DL — SIGNIFICANT CHANGE UP (ref 4.6–12)
TARGETS BLD QL SMEAR: SLIGHT — SIGNIFICANT CHANGE UP
TROPONIN T, HIGH SENSITIVITY RESULT: 1009 NG/L — HIGH (ref 0–51)
TROPONIN T, HIGH SENSITIVITY RESULT: 993 NG/L — HIGH (ref 0–51)
TSH SERPL-MCNC: 0.57 UIU/ML — SIGNIFICANT CHANGE UP (ref 0.27–4.2)
WBC # BLD: 10.73 K/UL — HIGH (ref 3.8–10.5)
WBC # BLD: 7.55 K/UL — SIGNIFICANT CHANGE UP (ref 3.8–10.5)
WBC # FLD AUTO: 10.73 K/UL — HIGH (ref 3.8–10.5)
WBC # FLD AUTO: 7.55 K/UL — SIGNIFICANT CHANGE UP (ref 3.8–10.5)

## 2022-07-07 PROCEDURE — 33970 AORTIC CIRCULATION ASSIST: CPT

## 2022-07-07 PROCEDURE — 33508 ENDOSCOPIC VEIN HARVEST: CPT | Mod: 59

## 2022-07-07 PROCEDURE — 99291 CRITICAL CARE FIRST HOUR: CPT

## 2022-07-07 PROCEDURE — 71045 X-RAY EXAM CHEST 1 VIEW: CPT | Mod: 26

## 2022-07-07 PROCEDURE — 33518 CABG ARTERY-VEIN TWO: CPT

## 2022-07-07 PROCEDURE — 93010 ELECTROCARDIOGRAM REPORT: CPT

## 2022-07-07 PROCEDURE — 33533 CABG ARTERIAL SINGLE: CPT

## 2022-07-07 DEVICE — SHUNT FLO-THRU INTRALUMINAL 1MM X 18MM: Type: IMPLANTABLE DEVICE | Status: FUNCTIONAL

## 2022-07-07 DEVICE — MEDIASTINAL CATH DRAIN 9MM: Type: IMPLANTABLE DEVICE | Status: FUNCTIONAL

## 2022-07-07 DEVICE — CANNULA AORTIC PERFUSION EZ GLIDE STRAIGHT 21FR X 35CM NON-VENTED: Type: IMPLANTABLE DEVICE | Status: FUNCTIONAL

## 2022-07-07 DEVICE — LIGATING CLIPS WECK HORIZON MEDIUM (BLUE) 24: Type: IMPLANTABLE DEVICE | Status: FUNCTIONAL

## 2022-07-07 DEVICE — LIGATING CLIPS WECK HORIZON SMALL-WIDE (RED) 24: Type: IMPLANTABLE DEVICE | Status: FUNCTIONAL

## 2022-07-07 DEVICE — SHUNT FLO-THRU INTRALUMINAL1.5MM X 18MM: Type: IMPLANTABLE DEVICE | Status: FUNCTIONAL

## 2022-07-07 DEVICE — SURGICEL 2 X 14": Type: IMPLANTABLE DEVICE | Status: FUNCTIONAL

## 2022-07-07 DEVICE — CANNULA VENOUS 2 STAGE LIGHTHOUSE TIP 28-38FR X 1/2" NON-VENTED: Type: IMPLANTABLE DEVICE | Status: FUNCTIONAL

## 2022-07-07 DEVICE — PACING WIRE WHITE M-24 BAREWIRE 37MM X 89MM: Type: IMPLANTABLE DEVICE | Status: FUNCTIONAL

## 2022-07-07 DEVICE — CANNULA RCSP 15FR X 12".5" MANUAL-INFLATE CUFF WITH GUIDEWIRE STYLET: Type: IMPLANTABLE DEVICE | Status: FUNCTIONAL

## 2022-07-07 DEVICE — CANNULA AORTIC ROOT 14G X 14CM FLANGED: Type: IMPLANTABLE DEVICE | Status: FUNCTIONAL

## 2022-07-07 DEVICE — KIT CVC 2LUM MAC 9FR CHG: Type: IMPLANTABLE DEVICE | Status: FUNCTIONAL

## 2022-07-07 DEVICE — KIT A-LINE 1LUM 20G X 12CM SAFE KIT: Type: IMPLANTABLE DEVICE | Status: FUNCTIONAL

## 2022-07-07 DEVICE — BONE WAX 2.5GM: Type: IMPLANTABLE DEVICE | Status: FUNCTIONAL

## 2022-07-07 DEVICE — CATH IAB SENSATION PLUS FIBER OPTIC 7.5 FR. 40CC: Type: IMPLANTABLE DEVICE | Status: FUNCTIONAL

## 2022-07-07 DEVICE — SURGICEL FIBRILLAR 2 X 4": Type: IMPLANTABLE DEVICE | Status: FUNCTIONAL

## 2022-07-07 RX ORDER — CHLORHEXIDINE GLUCONATE 213 G/1000ML
15 SOLUTION TOPICAL EVERY 12 HOURS
Refills: 0 | Status: DISCONTINUED | OUTPATIENT
Start: 2022-07-07 | End: 2022-07-08

## 2022-07-07 RX ORDER — ASCORBIC ACID 60 MG
500 TABLET,CHEWABLE ORAL
Refills: 0 | Status: COMPLETED | OUTPATIENT
Start: 2022-07-07 | End: 2022-07-12

## 2022-07-07 RX ORDER — ACETAMINOPHEN 500 MG
650 TABLET ORAL EVERY 6 HOURS
Refills: 0 | Status: COMPLETED | OUTPATIENT
Start: 2022-07-07 | End: 2022-07-10

## 2022-07-07 RX ORDER — OXYCODONE HYDROCHLORIDE 5 MG/1
5 TABLET ORAL EVERY 4 HOURS
Refills: 0 | Status: DISCONTINUED | OUTPATIENT
Start: 2022-07-07 | End: 2022-07-12

## 2022-07-07 RX ORDER — SODIUM CHLORIDE 9 MG/ML
1000 INJECTION INTRAMUSCULAR; INTRAVENOUS; SUBCUTANEOUS
Refills: 0 | Status: DISCONTINUED | OUTPATIENT
Start: 2022-07-07 | End: 2022-07-12

## 2022-07-07 RX ORDER — CHLORHEXIDINE GLUCONATE 213 G/1000ML
1 SOLUTION TOPICAL DAILY
Refills: 0 | Status: DISCONTINUED | OUTPATIENT
Start: 2022-07-07 | End: 2022-07-09

## 2022-07-07 RX ORDER — CEFUROXIME AXETIL 250 MG
1500 TABLET ORAL EVERY 8 HOURS
Refills: 0 | Status: COMPLETED | OUTPATIENT
Start: 2022-07-07 | End: 2022-07-09

## 2022-07-07 RX ORDER — DEXMEDETOMIDINE HYDROCHLORIDE IN 0.9% SODIUM CHLORIDE 4 UG/ML
1.5 INJECTION INTRAVENOUS
Qty: 200 | Refills: 0 | Status: DISCONTINUED | OUTPATIENT
Start: 2022-07-07 | End: 2022-07-09

## 2022-07-07 RX ORDER — CALCIUM GLUCONATE 100 MG/ML
1 VIAL (ML) INTRAVENOUS ONCE
Refills: 0 | Status: COMPLETED | OUTPATIENT
Start: 2022-07-07 | End: 2022-07-07

## 2022-07-07 RX ORDER — MILRINONE LACTATE 1 MG/ML
0.1 INJECTION, SOLUTION INTRAVENOUS
Qty: 20 | Refills: 0 | Status: DISCONTINUED | OUTPATIENT
Start: 2022-07-07 | End: 2022-07-10

## 2022-07-07 RX ORDER — NICARDIPINE HYDROCHLORIDE 30 MG/1
3 CAPSULE, EXTENDED RELEASE ORAL
Qty: 40 | Refills: 0 | Status: DISCONTINUED | OUTPATIENT
Start: 2022-07-07 | End: 2022-07-09

## 2022-07-07 RX ORDER — POTASSIUM CHLORIDE 20 MEQ
10 PACKET (EA) ORAL
Refills: 0 | Status: DISCONTINUED | OUTPATIENT
Start: 2022-07-07 | End: 2022-07-09

## 2022-07-07 RX ORDER — ALBUMIN HUMAN 25 %
250 VIAL (ML) INTRAVENOUS ONCE
Refills: 0 | Status: COMPLETED | OUTPATIENT
Start: 2022-07-07 | End: 2022-07-07

## 2022-07-07 RX ORDER — DOBUTAMINE HCL 250MG/20ML
2 VIAL (ML) INTRAVENOUS
Qty: 500 | Refills: 0 | Status: DISCONTINUED | OUTPATIENT
Start: 2022-07-07 | End: 2022-07-07

## 2022-07-07 RX ORDER — INSULIN HUMAN 100 [IU]/ML
3 INJECTION, SOLUTION SUBCUTANEOUS
Qty: 100 | Refills: 0 | Status: DISCONTINUED | OUTPATIENT
Start: 2022-07-07 | End: 2022-07-09

## 2022-07-07 RX ORDER — SENNA PLUS 8.6 MG/1
2 TABLET ORAL AT BEDTIME
Refills: 0 | Status: DISCONTINUED | OUTPATIENT
Start: 2022-07-08 | End: 2022-07-12

## 2022-07-07 RX ORDER — POTASSIUM CHLORIDE 20 MEQ
10 PACKET (EA) ORAL
Refills: 0 | Status: COMPLETED | OUTPATIENT
Start: 2022-07-07 | End: 2022-07-07

## 2022-07-07 RX ORDER — ACETAMINOPHEN 500 MG
650 TABLET ORAL EVERY 6 HOURS
Refills: 0 | Status: DISCONTINUED | OUTPATIENT
Start: 2022-07-10 | End: 2022-07-12

## 2022-07-07 RX ORDER — GABAPENTIN 400 MG/1
100 CAPSULE ORAL EVERY 8 HOURS
Refills: 0 | Status: DISCONTINUED | OUTPATIENT
Start: 2022-07-07 | End: 2022-07-12

## 2022-07-07 RX ORDER — PANTOPRAZOLE SODIUM 20 MG/1
40 TABLET, DELAYED RELEASE ORAL DAILY
Refills: 0 | Status: DISCONTINUED | OUTPATIENT
Start: 2022-07-07 | End: 2022-07-09

## 2022-07-07 RX ORDER — OXYCODONE HYDROCHLORIDE 5 MG/1
10 TABLET ORAL EVERY 4 HOURS
Refills: 0 | Status: DISCONTINUED | OUTPATIENT
Start: 2022-07-07 | End: 2022-07-12

## 2022-07-07 RX ORDER — POLYETHYLENE GLYCOL 3350 17 G/17G
17 POWDER, FOR SOLUTION ORAL DAILY
Refills: 0 | Status: DISCONTINUED | OUTPATIENT
Start: 2022-07-08 | End: 2022-07-12

## 2022-07-07 RX ORDER — AMIODARONE HYDROCHLORIDE 400 MG/1
400 TABLET ORAL
Refills: 0 | Status: DISCONTINUED | OUTPATIENT
Start: 2022-07-07 | End: 2022-07-09

## 2022-07-07 RX ORDER — HYDROMORPHONE HYDROCHLORIDE 2 MG/ML
0.5 INJECTION INTRAMUSCULAR; INTRAVENOUS; SUBCUTANEOUS EVERY 6 HOURS
Refills: 0 | Status: DISCONTINUED | OUTPATIENT
Start: 2022-07-07 | End: 2022-07-09

## 2022-07-07 RX ORDER — DEXTROSE 50 % IN WATER 50 %
50 SYRINGE (ML) INTRAVENOUS
Refills: 0 | Status: DISCONTINUED | OUTPATIENT
Start: 2022-07-07 | End: 2022-07-12

## 2022-07-07 RX ORDER — DEXTROSE 50 % IN WATER 50 %
25 SYRINGE (ML) INTRAVENOUS
Refills: 0 | Status: DISCONTINUED | OUTPATIENT
Start: 2022-07-07 | End: 2022-07-12

## 2022-07-07 RX ORDER — ACETAMINOPHEN 500 MG
1000 TABLET ORAL ONCE
Refills: 0 | Status: COMPLETED | OUTPATIENT
Start: 2022-07-07 | End: 2022-07-07

## 2022-07-07 RX ADMIN — HYDROMORPHONE HYDROCHLORIDE 0.5 MILLIGRAM(S): 2 INJECTION INTRAMUSCULAR; INTRAVENOUS; SUBCUTANEOUS at 19:35

## 2022-07-07 RX ADMIN — Medication 650 MILLIGRAM(S): at 18:29

## 2022-07-07 RX ADMIN — Medication 100 MILLIEQUIVALENT(S): at 21:29

## 2022-07-07 RX ADMIN — Medication 650 MILLIGRAM(S): at 18:13

## 2022-07-07 RX ADMIN — GABAPENTIN 300 MILLIGRAM(S): 400 CAPSULE ORAL at 05:18

## 2022-07-07 RX ADMIN — AMIODARONE HYDROCHLORIDE 400 MILLIGRAM(S): 400 TABLET ORAL at 18:13

## 2022-07-07 RX ADMIN — Medication 100 MILLIEQUIVALENT(S): at 23:17

## 2022-07-07 RX ADMIN — Medication 100 MILLIGRAM(S): at 16:38

## 2022-07-07 RX ADMIN — Medication 1000 MILLIGRAM(S): at 23:17

## 2022-07-07 RX ADMIN — Medication 100 GRAM(S): at 20:26

## 2022-07-07 RX ADMIN — Medication 50 MILLIGRAM(S): at 05:19

## 2022-07-07 RX ADMIN — CHLORHEXIDINE GLUCONATE 15 MILLILITER(S): 213 SOLUTION TOPICAL at 18:29

## 2022-07-07 RX ADMIN — Medication 500 MILLIGRAM(S): at 18:15

## 2022-07-07 RX ADMIN — Medication 400 MILLIGRAM(S): at 22:26

## 2022-07-07 RX ADMIN — Medication 1000 MILLIGRAM(S): at 06:24

## 2022-07-07 RX ADMIN — HYDROMORPHONE HYDROCHLORIDE 0.5 MILLIGRAM(S): 2 INJECTION INTRAMUSCULAR; INTRAVENOUS; SUBCUTANEOUS at 19:45

## 2022-07-07 RX ADMIN — AMLODIPINE BESYLATE 10 MILLIGRAM(S): 2.5 TABLET ORAL at 05:18

## 2022-07-07 RX ADMIN — MILRINONE LACTATE 10.8 MICROGRAM(S)/KG/MIN: 1 INJECTION, SOLUTION INTRAVENOUS at 22:28

## 2022-07-07 RX ADMIN — Medication 1000 MILLIGRAM(S): at 05:19

## 2022-07-07 RX ADMIN — GABAPENTIN 100 MILLIGRAM(S): 400 CAPSULE ORAL at 22:30

## 2022-07-07 RX ADMIN — MUPIROCIN 1 APPLICATION(S): 20 OINTMENT TOPICAL at 05:19

## 2022-07-07 RX ADMIN — NICARDIPINE HYDROCHLORIDE 15 MG/HR: 30 CAPSULE, EXTENDED RELEASE ORAL at 22:29

## 2022-07-07 RX ADMIN — Medication 100 GRAM(S): at 18:14

## 2022-07-07 RX ADMIN — CHLORHEXIDINE GLUCONATE 30 MILLILITER(S): 213 SOLUTION TOPICAL at 05:19

## 2022-07-07 RX ADMIN — Medication 125 MILLILITER(S): at 21:20

## 2022-07-07 RX ADMIN — INSULIN HUMAN 3 UNIT(S)/HR: 100 INJECTION, SOLUTION SUBCUTANEOUS at 22:29

## 2022-07-07 RX ADMIN — Medication 100 MILLIEQUIVALENT(S): at 22:00

## 2022-07-07 NOTE — PRE-OP CHECKLIST - SELECT TESTS ORDERED
BMP/CBC/PT/PTT/INR/Type and Screen/POCT Blood Glucose/COVID-19 BMP/CBC/PT/PTT/INR/Type and Screen/Urinalysis/POCT Blood Glucose/COVID-19

## 2022-07-07 NOTE — PRE-ANESTHESIA EVALUATION ADULT - NSANTHADDINFOFT_GEN_ALL_CORE
Anesthetic plan, including risks and benefits, discussed with patient.  Patient seen and evaluated in holding area prior to entering the operating room.

## 2022-07-07 NOTE — PRE-ANESTHESIA EVALUATION ADULT - NSANTHPMHFT_GEN_ALL_CORE
** from progress note**  70 yo male PMH DM2 A1C 5.7, former smoker 30 pack yrs, bilat carotid stenosis,  HTN, HLD who presented to cardiology for evaluation of left shoulder pain. NST 1/8/2021 EF 61% septal and apical moderate perfusion defect, moderate inferior defect.  Cardiac cath done showing Multivessel disease - for CABG

## 2022-07-07 NOTE — BRIEF OPERATIVE NOTE - NSICDXBRIEFPROCEDURE_GEN_ALL_CORE_FT
PROCEDURES:  CABG, with STEVE 07-Jul-2022 14:45:17  Eber Harper  Endoscopic vein harvest for CABG 07-Jul-2022 14:45:34  Eber Harper

## 2022-07-07 NOTE — PROGRESS NOTE ADULT - SUBJECTIVE AND OBJECTIVE BOX
Patient seen and examined at the bedside.    Remained critically ill on continuous ICU monitoring.    OBJECTIVE:  Vital Signs Last 24 Hrs  T(C): 36.2 (07 Jul 2022 14:41), Max: 36.7 (06 Jul 2022 18:50)  T(F): 97.2 (07 Jul 2022 14:41), Max: 98 (06 Jul 2022 18:50)  HR: 102 (07 Jul 2022 17:45) (73 - 109)  BP: 102/61 (07 Jul 2022 07:15) (102/61 - 114/64)  BP(mean): --  RR: 15 (07 Jul 2022 17:45) (12 - 19)  SpO2: 100% (07 Jul 2022 17:45) (96% - 100%)    Parameters below as of 07 Jul 2022 14:41  Patient On (Oxygen Delivery Method): ventilator    O2 Concentration (%): 100    REVIEW OF SYSTEMS:  [x ] N/A    Physical Exam:  General: intubated multiple lines gtt & tubes   Neurology: sedated   Eyes: bilateral pupils equal and reactive   ENT/Neck: +ETT midline, Neck supple, trachea midline, No JVD   Respiratory: Rales noted bilaterally   CV: RRR, S1S2, no murmurs        [x] Sternal dressing, [x] Mediastinal CT, [x] Pleural CT,        [x] Sinus Tachycardia,   Abdominal: Soft, NT, ND +BS,   Extremities: 1-2+ pedal edema noted, + peripheral pulses   Skin: No Rashes, Hematoma, Ecchymosis                           Assessment:    Plan:   ***Neuro***   [x] Sedated with [x] Precedex   Post operative neuro assessment     ***Cardiovascular***  Invasive hemodynamic monitoring, assess perfusion indices   Sinus Tachy / CVP 3/ MAP 67/SvO2 ___ / Hct 27.3/ Lactate 3.4  [x] Primacor 0.375 mcg/kg/min  [x] Dobutamine 2 mcg/kg/min  [x] Cardene 3 mg/hr  [x] IABP- 1:1  Volume: [x] 1L LR [x]PRBC x1 [x] Cryo x5 [x] FEIBA x2   Reassessment of hemodynamics post resuscitation   Continue with Amiodarone for atrial fibrillation prophylaxis.  Monitor chest tube outputs   [x] Bleeding   Serial EKG and cardiac enzymes     ***Pulmonary***  Post op vent management   Titration of FiO2 and PEEP, follow SpO2, CXR, blood gasses     Mode: AC/ CMV (Assist Control/ Continuous Mandatory Ventilation)  RR (machine): 12  TV (machine): 600  FiO2: 60  PEEP: 5  ITime: 1  MAP: 9  PIP: 20              Will plan to wean & extubate once pt is hemodynamically stable and not bleeding    ***GI***  [x] NPO   [x] Protonix     ***Renal***  GFR __ /   Continue to monitor I/Os, BUN/Creatinine.   Replete lytes PRN  Song present [x] negative     ***ID***  Perioperative antibiotics     ***Endocrine***  [x] Hyperglycemia: HbA1c 5.9%              - [x] Insulin gtt              - Need tight glycemic control to prevent wound infection.        Patient requires continuous monitoring with bedside rhythm monitoring, pulse oximetry monitoring, and continuous central venous and arterial pressure monitoring; and intermittent blood gas analysis. Care plan discussed with the ICU care team.   Patient remained critical, at risk for life threatening decompensation.    I have spent 45 minutes providing critical care management to this patient.    By signing my name below, I, Bulmaro Rosenberg, attest that this documentation has been prepared under the direction and in the presence of Muriel Reyna MD   Electronically signed: Christiano Newman, 07-07-22 @ 17:55    I, Muriel Reyna, personally performed the services described in this documentation. all medical record entries made by the scribe were at my direction and in my presence. I have reviewed the chart and agree that the record reflects my personal performance and is accurate and complete  Electronically signed: Muriel Reyna MD    Patient seen and examined at the bedside.    Remained critically ill on continuous ICU monitoring.    OBJECTIVE:  Vital Signs Last 24 Hrs  T(C): 36.2 (07 Jul 2022 14:41), Max: 36.7 (06 Jul 2022 18:50)  T(F): 97.2 (07 Jul 2022 14:41), Max: 98 (06 Jul 2022 18:50)  HR: 102 (07 Jul 2022 17:45) (73 - 109)  BP: 102/61 (07 Jul 2022 07:15) (102/61 - 114/64)  BP(mean): --  RR: 15 (07 Jul 2022 17:45) (12 - 19)  SpO2: 100% (07 Jul 2022 17:45) (96% - 100%)    Parameters below as of 07 Jul 2022 14:41  Patient On (Oxygen Delivery Method): ventilator    O2 Concentration (%): 100    REVIEW OF SYSTEMS:  [x ] N/A    Physical Exam:  General: intubated multiple lines gtt & tubes   Neurology: sedated   Eyes: bilateral pupils equal and reactive   ENT/Neck: +ETT midline, Neck supple, trachea midline, No JVD   Respiratory: Rales noted bilaterally   CV: RRR, S1S2, no murmurs        [x] Sternal dressing, [x] Mediastinal CT x2  [x] L Pleural CT x 1         [x] Sinus Tachycardia,   Abdominal: Soft, NT, ND +BS,   Extremities: 1-2+ pedal edema noted, + peripheral pulses IABP  Skin: No Rashes, Hematoma, Ecchymosis                           Assessment:  71 yr M DM2 / HLD / HTN / CAD / B/L Carotid stenosis   S/P CABG X3 POD # 0  Cardiogenic shock Biventricular systolic dysfunction , MR, TR  requiring IABP & Inotropic support   Hypovolemia   Bleeding requiring PRBC & products   Lactic acidosis   DM2 / Hyperglycemia     Plan:   ***Neuro***   [x] Sedated with [x] Precedex   Post operative neuro assessment     ***Cardiovascular***  Invasive hemodynamic monitoring, assess perfusion indices    / CVP 3-6 / MAP 65-75 /SvO2 65 / Hct 27.3/ Lactate 3.4  [x] Primacor 0.375 mcg/kg/min  [x] Dobutamine 2 mcg/kg/min  [x] Cardene 3 mg/hr  [x] IABP- 1:1 diastolic augmentation   Volume: [x] 1000 cc LR [x]PRBC x 2 [x] Cryo x 10  [x] FEIBA x2 [X] FFP X2  Monitor chest tube outputs / check CXR / Repeat Coag / Platelets   [x] Bleeding   Serial EKG and cardiac enzymes   [X] ASA on hold   [X] Statin once extubated     ***Pulmonary***  Post op vent management   Titration of FiO2 and PEEP, follow SpO2, CXR, blood gasses     Mode: AC/ CMV (Assist Control/ Continuous Mandatory Ventilation)  RR (machine): 12  TV (machine): 600  FiO2: 60  PEEP: 5  ITime: 1  MAP: 9  PIP: 20              Will plan to wean & extubate once pt is hemodynamically stable and not bleeding    ***GI***  [x] NPO   [x] Protonix     ***Renal***  Continue to monitor I/Os, BUN/Creatinine.   Replete lytes PRN  Song    ***ID***  Perioperative antibiotics     ***Endocrine***  [x] Hyperglycemia: HbA1c 5.9%              - [x] Insulin gtt              - Need tight glycemic control to prevent wound infection.        Patient requires continuous monitoring with bedside rhythm monitoring, pulse oximetry monitoring, and continuous central venous and arterial pressure monitoring; and intermittent blood gas analysis. Care plan discussed with the ICU care team.   Patient remained critical, at risk for life threatening decompensation.    I have spent 45 minutes providing critical care management to this patient.    By signing my name below, I, Bulmaro Rosenberg, attest that this documentation has been prepared under the direction and in the presence of Muriel Reyna MD   Electronically signed: Christiano Newman, 07-07-22 @ 17:55    I, Muriel Reyna, personally performed the services described in this documentation. all medical record entries made by the fordibmiroslava were at my direction and in my presence. I have reviewed the chart and agree that the record reflects my personal performance and is accurate and complete  Electronically signed: Muriel Reyna MD

## 2022-07-08 LAB
ALBUMIN SERPL ELPH-MCNC: 3.6 G/DL — SIGNIFICANT CHANGE UP (ref 3.3–5)
ALBUMIN SERPL ELPH-MCNC: 3.8 G/DL — SIGNIFICANT CHANGE UP (ref 3.3–5)
ALP SERPL-CCNC: 50 U/L — SIGNIFICANT CHANGE UP (ref 40–120)
ALP SERPL-CCNC: 50 U/L — SIGNIFICANT CHANGE UP (ref 40–120)
ALT FLD-CCNC: 30 U/L — SIGNIFICANT CHANGE UP (ref 10–45)
ALT FLD-CCNC: 32 U/L — SIGNIFICANT CHANGE UP (ref 10–45)
ANION GAP SERPL CALC-SCNC: 10 MMOL/L — SIGNIFICANT CHANGE UP (ref 5–17)
ANION GAP SERPL CALC-SCNC: 16 MMOL/L — SIGNIFICANT CHANGE UP (ref 5–17)
APTT BLD: 33 SEC — SIGNIFICANT CHANGE UP (ref 27.5–35.5)
AST SERPL-CCNC: 56 U/L — HIGH (ref 10–40)
AST SERPL-CCNC: 60 U/L — HIGH (ref 10–40)
BASE EXCESS BLDV CALC-SCNC: 0 MMOL/L — SIGNIFICANT CHANGE UP (ref -2–2)
BASE EXCESS BLDV CALC-SCNC: 0.3 MMOL/L — SIGNIFICANT CHANGE UP (ref -2–2)
BASE EXCESS BLDV CALC-SCNC: 0.3 MMOL/L — SIGNIFICANT CHANGE UP (ref -2–2)
BASE EXCESS BLDV CALC-SCNC: 1.2 MMOL/L — SIGNIFICANT CHANGE UP (ref -2–2)
BASE EXCESS BLDV CALC-SCNC: 1.3 MMOL/L — SIGNIFICANT CHANGE UP (ref -2–2)
BASE EXCESS BLDV CALC-SCNC: 2 MMOL/L — SIGNIFICANT CHANGE UP (ref -2–2)
BASE EXCESS BLDV CALC-SCNC: 2 MMOL/L — SIGNIFICANT CHANGE UP (ref -2–2)
BASE EXCESS BLDV CALC-SCNC: 2.2 MMOL/L — HIGH (ref -2–2)
BASOPHILS # BLD AUTO: 0.02 K/UL — SIGNIFICANT CHANGE UP (ref 0–0.2)
BASOPHILS NFR BLD AUTO: 0.2 % — SIGNIFICANT CHANGE UP (ref 0–2)
BILIRUB SERPL-MCNC: 0.5 MG/DL — SIGNIFICANT CHANGE UP (ref 0.2–1.2)
BILIRUB SERPL-MCNC: 1 MG/DL — SIGNIFICANT CHANGE UP (ref 0.2–1.2)
BUN SERPL-MCNC: 18 MG/DL — SIGNIFICANT CHANGE UP (ref 7–23)
BUN SERPL-MCNC: 18 MG/DL — SIGNIFICANT CHANGE UP (ref 7–23)
CA-I SERPL-SCNC: 1.12 MMOL/L — LOW (ref 1.15–1.33)
CALCIUM SERPL-MCNC: 8.2 MG/DL — LOW (ref 8.4–10.5)
CALCIUM SERPL-MCNC: 8.9 MG/DL — SIGNIFICANT CHANGE UP (ref 8.4–10.5)
CHLORIDE BLDV-SCNC: 106 MMOL/L — SIGNIFICANT CHANGE UP (ref 96–108)
CHLORIDE SERPL-SCNC: 105 MMOL/L — SIGNIFICANT CHANGE UP (ref 96–108)
CHLORIDE SERPL-SCNC: 108 MMOL/L — SIGNIFICANT CHANGE UP (ref 96–108)
CK MB BLD-MCNC: 1 % — SIGNIFICANT CHANGE UP (ref 0–3.5)
CK MB BLD-MCNC: 2.1 % — SIGNIFICANT CHANGE UP (ref 0–3.5)
CK MB CFR SERPL CALC: 15.4 NG/ML — HIGH (ref 0–6.7)
CK MB CFR SERPL CALC: 29 NG/ML — HIGH (ref 0–6.7)
CK SERPL-CCNC: 1363 U/L — HIGH (ref 30–200)
CK SERPL-CCNC: 1467 U/L — HIGH (ref 30–200)
CO2 BLDV-SCNC: 27 MMOL/L — HIGH (ref 22–26)
CO2 BLDV-SCNC: 28 MMOL/L — HIGH (ref 22–26)
CO2 BLDV-SCNC: 28 MMOL/L — HIGH (ref 22–26)
CO2 BLDV-SCNC: 29 MMOL/L — HIGH (ref 22–26)
CO2 BLDV-SCNC: 29 MMOL/L — HIGH (ref 22–26)
CO2 SERPL-SCNC: 23 MMOL/L — SIGNIFICANT CHANGE UP (ref 22–31)
CO2 SERPL-SCNC: 23 MMOL/L — SIGNIFICANT CHANGE UP (ref 22–31)
CREAT SERPL-MCNC: 0.93 MG/DL — SIGNIFICANT CHANGE UP (ref 0.5–1.3)
CREAT SERPL-MCNC: 1.02 MG/DL — SIGNIFICANT CHANGE UP (ref 0.5–1.3)
EGFR: 79 ML/MIN/1.73M2 — SIGNIFICANT CHANGE UP
EGFR: 88 ML/MIN/1.73M2 — SIGNIFICANT CHANGE UP
EOSINOPHIL # BLD AUTO: 0 K/UL — SIGNIFICANT CHANGE UP (ref 0–0.5)
EOSINOPHIL NFR BLD AUTO: 0 % — SIGNIFICANT CHANGE UP (ref 0–6)
FIBRINOGEN PPP-MCNC: 479 MG/DL — SIGNIFICANT CHANGE UP (ref 330–520)
GAS PNL BLDA: SIGNIFICANT CHANGE UP
GAS PNL BLDV: 139 MMOL/L — SIGNIFICANT CHANGE UP (ref 136–145)
GAS PNL BLDV: SIGNIFICANT CHANGE UP
GLUCOSE BLDC GLUCOMTR-MCNC: 101 MG/DL — HIGH (ref 70–99)
GLUCOSE BLDC GLUCOMTR-MCNC: 108 MG/DL — HIGH (ref 70–99)
GLUCOSE BLDC GLUCOMTR-MCNC: 115 MG/DL — HIGH (ref 70–99)
GLUCOSE BLDC GLUCOMTR-MCNC: 120 MG/DL — HIGH (ref 70–99)
GLUCOSE BLDC GLUCOMTR-MCNC: 123 MG/DL — HIGH (ref 70–99)
GLUCOSE BLDC GLUCOMTR-MCNC: 123 MG/DL — HIGH (ref 70–99)
GLUCOSE BLDC GLUCOMTR-MCNC: 125 MG/DL — HIGH (ref 70–99)
GLUCOSE BLDC GLUCOMTR-MCNC: 134 MG/DL — HIGH (ref 70–99)
GLUCOSE BLDC GLUCOMTR-MCNC: 148 MG/DL — HIGH (ref 70–99)
GLUCOSE BLDC GLUCOMTR-MCNC: 179 MG/DL — HIGH (ref 70–99)
GLUCOSE BLDV-MCNC: 126 MG/DL — HIGH (ref 70–99)
GLUCOSE SERPL-MCNC: 133 MG/DL — HIGH (ref 70–99)
GLUCOSE SERPL-MCNC: 171 MG/DL — HIGH (ref 70–99)
HCO3 BLDV-SCNC: 25 MMOL/L — SIGNIFICANT CHANGE UP (ref 22–29)
HCO3 BLDV-SCNC: 25 MMOL/L — SIGNIFICANT CHANGE UP (ref 22–29)
HCO3 BLDV-SCNC: 26 MMOL/L — SIGNIFICANT CHANGE UP (ref 22–29)
HCO3 BLDV-SCNC: 27 MMOL/L — SIGNIFICANT CHANGE UP (ref 22–29)
HCO3 BLDV-SCNC: 28 MMOL/L — SIGNIFICANT CHANGE UP (ref 22–29)
HCT VFR BLD CALC: 27.3 % — LOW (ref 39–50)
HCT VFR BLD CALC: 28.7 % — LOW (ref 39–50)
HCT VFR BLD CALC: 29.1 % — LOW (ref 39–50)
HCT VFR BLDA CALC: 29 % — LOW (ref 39–51)
HGB BLD CALC-MCNC: 9.8 G/DL — LOW (ref 12.6–17.4)
HGB BLD-MCNC: 9.1 G/DL — LOW (ref 13–17)
HGB BLD-MCNC: 9.5 G/DL — LOW (ref 13–17)
HGB BLD-MCNC: 9.7 G/DL — LOW (ref 13–17)
HOROWITZ INDEX BLDV+IHG-RTO: 36 — SIGNIFICANT CHANGE UP
HOROWITZ INDEX BLDV+IHG-RTO: 44 — SIGNIFICANT CHANGE UP
HOROWITZ INDEX BLDV+IHG-RTO: 50 — SIGNIFICANT CHANGE UP
IMM GRANULOCYTES NFR BLD AUTO: 0.6 % — SIGNIFICANT CHANGE UP (ref 0–1.5)
INR BLD: 1.25 RATIO — HIGH (ref 0.88–1.16)
LACTATE BLDV-MCNC: 1.6 MMOL/L — SIGNIFICANT CHANGE UP (ref 0.7–2)
LYMPHOCYTES # BLD AUTO: 1.27 K/UL — SIGNIFICANT CHANGE UP (ref 1–3.3)
LYMPHOCYTES # BLD AUTO: 11.5 % — LOW (ref 13–44)
MAGNESIUM SERPL-MCNC: 2 MG/DL — SIGNIFICANT CHANGE UP (ref 1.6–2.6)
MCHC RBC-ENTMCNC: 27.6 PG — SIGNIFICANT CHANGE UP (ref 27–34)
MCHC RBC-ENTMCNC: 27.8 PG — SIGNIFICANT CHANGE UP (ref 27–34)
MCHC RBC-ENTMCNC: 28.2 PG — SIGNIFICANT CHANGE UP (ref 27–34)
MCHC RBC-ENTMCNC: 33.1 GM/DL — SIGNIFICANT CHANGE UP (ref 32–36)
MCHC RBC-ENTMCNC: 33.3 GM/DL — SIGNIFICANT CHANGE UP (ref 32–36)
MCHC RBC-ENTMCNC: 33.3 GM/DL — SIGNIFICANT CHANGE UP (ref 32–36)
MCV RBC AUTO: 82.7 FL — SIGNIFICANT CHANGE UP (ref 80–100)
MCV RBC AUTO: 83.9 FL — SIGNIFICANT CHANGE UP (ref 80–100)
MCV RBC AUTO: 84.6 FL — SIGNIFICANT CHANGE UP (ref 80–100)
MONOCYTES # BLD AUTO: 1.33 K/UL — HIGH (ref 0–0.9)
MONOCYTES NFR BLD AUTO: 12.1 % — SIGNIFICANT CHANGE UP (ref 2–14)
NEUTROPHILS # BLD AUTO: 8.32 K/UL — HIGH (ref 1.8–7.4)
NEUTROPHILS NFR BLD AUTO: 75.6 % — SIGNIFICANT CHANGE UP (ref 43–77)
NRBC # BLD: 0 /100 WBCS — SIGNIFICANT CHANGE UP (ref 0–0)
PCO2 BLDV: 40 MMHG — LOW (ref 42–55)
PCO2 BLDV: 40 MMHG — LOW (ref 42–55)
PCO2 BLDV: 41 MMHG — LOW (ref 42–55)
PCO2 BLDV: 42 MMHG — SIGNIFICANT CHANGE UP (ref 42–55)
PCO2 BLDV: 43 MMHG — SIGNIFICANT CHANGE UP (ref 42–55)
PCO2 BLDV: 45 MMHG — SIGNIFICANT CHANGE UP (ref 42–55)
PCO2 BLDV: 46 MMHG — SIGNIFICANT CHANGE UP (ref 42–55)
PCO2 BLDV: 47 MMHG — SIGNIFICANT CHANGE UP (ref 42–55)
PH BLDV: 7.37 — SIGNIFICANT CHANGE UP (ref 7.32–7.43)
PH BLDV: 7.37 — SIGNIFICANT CHANGE UP (ref 7.32–7.43)
PH BLDV: 7.38 — SIGNIFICANT CHANGE UP (ref 7.32–7.43)
PH BLDV: 7.39 — SIGNIFICANT CHANGE UP (ref 7.32–7.43)
PH BLDV: 7.39 — SIGNIFICANT CHANGE UP (ref 7.32–7.43)
PH BLDV: 7.41 — SIGNIFICANT CHANGE UP (ref 7.32–7.43)
PH BLDV: 7.43 — SIGNIFICANT CHANGE UP (ref 7.32–7.43)
PH BLDV: 7.43 — SIGNIFICANT CHANGE UP (ref 7.32–7.43)
PHOSPHATE SERPL-MCNC: 2.8 MG/DL — SIGNIFICANT CHANGE UP (ref 2.5–4.5)
PLATELET # BLD AUTO: 140 K/UL — LOW (ref 150–400)
PLATELET # BLD AUTO: 145 K/UL — LOW (ref 150–400)
PLATELET # BLD AUTO: 153 K/UL — SIGNIFICANT CHANGE UP (ref 150–400)
PO2 BLDV: 36 MMHG — SIGNIFICANT CHANGE UP (ref 25–45)
PO2 BLDV: 37 MMHG — SIGNIFICANT CHANGE UP (ref 25–45)
PO2 BLDV: 38 MMHG — SIGNIFICANT CHANGE UP (ref 25–45)
PO2 BLDV: 39 MMHG — SIGNIFICANT CHANGE UP (ref 25–45)
PO2 BLDV: 40 MMHG — SIGNIFICANT CHANGE UP (ref 25–45)
PO2 BLDV: 48 MMHG — HIGH (ref 25–45)
PO2 BLDV: 50 MMHG — HIGH (ref 25–45)
PO2 BLDV: 53 MMHG — HIGH (ref 25–45)
POTASSIUM BLDV-SCNC: 4.3 MMOL/L — SIGNIFICANT CHANGE UP (ref 3.5–5.1)
POTASSIUM SERPL-MCNC: 4.1 MMOL/L — SIGNIFICANT CHANGE UP (ref 3.5–5.3)
POTASSIUM SERPL-MCNC: 4.6 MMOL/L — SIGNIFICANT CHANGE UP (ref 3.5–5.3)
POTASSIUM SERPL-SCNC: 4.1 MMOL/L — SIGNIFICANT CHANGE UP (ref 3.5–5.3)
POTASSIUM SERPL-SCNC: 4.6 MMOL/L — SIGNIFICANT CHANGE UP (ref 3.5–5.3)
PROT SERPL-MCNC: 5.8 G/DL — LOW (ref 6–8.3)
PROT SERPL-MCNC: 5.8 G/DL — LOW (ref 6–8.3)
PROTHROM AB SERPL-ACNC: 14.5 SEC — HIGH (ref 10.5–13.4)
RBC # BLD: 3.3 M/UL — LOW (ref 4.2–5.8)
RBC # BLD: 3.42 M/UL — LOW (ref 4.2–5.8)
RBC # BLD: 3.44 M/UL — LOW (ref 4.2–5.8)
RBC # FLD: 13.3 % — SIGNIFICANT CHANGE UP (ref 10.3–14.5)
RBC # FLD: 13.7 % — SIGNIFICANT CHANGE UP (ref 10.3–14.5)
RBC # FLD: 14 % — SIGNIFICANT CHANGE UP (ref 10.3–14.5)
SAO2 % BLDV: 55.8 % — LOW (ref 67–88)
SAO2 % BLDV: 56.2 % — LOW (ref 67–88)
SAO2 % BLDV: 57.2 % — LOW (ref 67–88)
SAO2 % BLDV: 63.6 % — LOW (ref 67–88)
SAO2 % BLDV: 65.6 % — LOW (ref 67–88)
SAO2 % BLDV: 76.1 % — SIGNIFICANT CHANGE UP (ref 67–88)
SAO2 % BLDV: 79.5 % — SIGNIFICANT CHANGE UP (ref 67–88)
SAO2 % BLDV: 79.8 % — SIGNIFICANT CHANGE UP (ref 67–88)
SODIUM SERPL-SCNC: 138 MMOL/L — SIGNIFICANT CHANGE UP (ref 135–145)
SODIUM SERPL-SCNC: 147 MMOL/L — HIGH (ref 135–145)
TROPONIN T, HIGH SENSITIVITY RESULT: 438 NG/L — HIGH (ref 0–51)
TROPONIN T, HIGH SENSITIVITY RESULT: 686 NG/L — HIGH (ref 0–51)
TSH RECEP AB FLD-ACNC: <1.1 IU/L — SIGNIFICANT CHANGE UP (ref 0–1.75)
TSH SERPL-MCNC: 0.33 UIU/ML — SIGNIFICANT CHANGE UP (ref 0.27–4.2)
WBC # BLD: 11.01 K/UL — HIGH (ref 3.8–10.5)
WBC # BLD: 6.88 K/UL — SIGNIFICANT CHANGE UP (ref 3.8–10.5)
WBC # BLD: 9.85 K/UL — SIGNIFICANT CHANGE UP (ref 3.8–10.5)
WBC # FLD AUTO: 11.01 K/UL — HIGH (ref 3.8–10.5)
WBC # FLD AUTO: 6.88 K/UL — SIGNIFICANT CHANGE UP (ref 3.8–10.5)
WBC # FLD AUTO: 9.85 K/UL — SIGNIFICANT CHANGE UP (ref 3.8–10.5)

## 2022-07-08 PROCEDURE — 93010 ELECTROCARDIOGRAM REPORT: CPT

## 2022-07-08 PROCEDURE — 33968 REMOVE AORTIC ASSIST DEVICE: CPT | Mod: 58

## 2022-07-08 PROCEDURE — 93010 ELECTROCARDIOGRAM REPORT: CPT | Mod: 77

## 2022-07-08 PROCEDURE — 36620 INSERTION CATHETER ARTERY: CPT | Mod: 58

## 2022-07-08 PROCEDURE — 99291 CRITICAL CARE FIRST HOUR: CPT

## 2022-07-08 PROCEDURE — 71045 X-RAY EXAM CHEST 1 VIEW: CPT | Mod: 26

## 2022-07-08 RX ORDER — AMIODARONE HYDROCHLORIDE 400 MG/1
0.5 TABLET ORAL
Qty: 900 | Refills: 0 | Status: DISCONTINUED | OUTPATIENT
Start: 2022-07-08 | End: 2022-07-09

## 2022-07-08 RX ORDER — SODIUM CHLORIDE 9 MG/ML
1000 INJECTION, SOLUTION INTRAVENOUS
Refills: 0 | Status: DISCONTINUED | OUTPATIENT
Start: 2022-07-08 | End: 2022-07-09

## 2022-07-08 RX ORDER — ASPIRIN/CALCIUM CARB/MAGNESIUM 324 MG
325 TABLET ORAL DAILY
Refills: 0 | Status: DISCONTINUED | OUTPATIENT
Start: 2022-07-08 | End: 2022-07-12

## 2022-07-08 RX ORDER — MAGNESIUM SULFATE 500 MG/ML
1 VIAL (ML) INJECTION ONCE
Refills: 0 | Status: COMPLETED | OUTPATIENT
Start: 2022-07-08 | End: 2022-07-08

## 2022-07-08 RX ORDER — INSULIN LISPRO 100/ML
VIAL (ML) SUBCUTANEOUS AT BEDTIME
Refills: 0 | Status: DISCONTINUED | OUTPATIENT
Start: 2022-07-08 | End: 2022-07-09

## 2022-07-08 RX ORDER — FENTANYL CITRATE 50 UG/ML
50 INJECTION INTRAVENOUS ONCE
Refills: 0 | Status: DISCONTINUED | OUTPATIENT
Start: 2022-07-08 | End: 2022-07-08

## 2022-07-08 RX ORDER — POTASSIUM CHLORIDE 20 MEQ
10 PACKET (EA) ORAL
Refills: 0 | Status: COMPLETED | OUTPATIENT
Start: 2022-07-08 | End: 2022-07-08

## 2022-07-08 RX ORDER — HYDRALAZINE HCL 50 MG
10 TABLET ORAL ONCE
Refills: 0 | Status: COMPLETED | OUTPATIENT
Start: 2022-07-08 | End: 2022-07-08

## 2022-07-08 RX ORDER — POTASSIUM CHLORIDE 20 MEQ
10 PACKET (EA) ORAL ONCE
Refills: 0 | Status: COMPLETED | OUTPATIENT
Start: 2022-07-08 | End: 2022-07-08

## 2022-07-08 RX ORDER — MAGNESIUM SULFATE 500 MG/ML
2 VIAL (ML) INJECTION ONCE
Refills: 0 | Status: COMPLETED | OUTPATIENT
Start: 2022-07-08 | End: 2022-07-08

## 2022-07-08 RX ORDER — FUROSEMIDE 40 MG
20 TABLET ORAL ONCE
Refills: 0 | Status: COMPLETED | OUTPATIENT
Start: 2022-07-08 | End: 2022-07-08

## 2022-07-08 RX ORDER — AMIODARONE HYDROCHLORIDE 400 MG/1
150 TABLET ORAL ONCE
Refills: 0 | Status: COMPLETED | OUTPATIENT
Start: 2022-07-08 | End: 2022-07-08

## 2022-07-08 RX ORDER — GLUCAGON INJECTION, SOLUTION 0.5 MG/.1ML
1 INJECTION, SOLUTION SUBCUTANEOUS ONCE
Refills: 0 | Status: DISCONTINUED | OUTPATIENT
Start: 2022-07-08 | End: 2022-07-12

## 2022-07-08 RX ORDER — LIDOCAINE HCL 20 MG/ML
100 VIAL (ML) INJECTION ONCE
Refills: 0 | Status: COMPLETED | OUTPATIENT
Start: 2022-07-08 | End: 2022-07-08

## 2022-07-08 RX ORDER — SODIUM CHLORIDE 9 MG/ML
250 INJECTION, SOLUTION INTRAVENOUS ONCE
Refills: 0 | Status: COMPLETED | OUTPATIENT
Start: 2022-07-08 | End: 2022-07-08

## 2022-07-08 RX ORDER — AMIODARONE HYDROCHLORIDE 400 MG/1
0.5 TABLET ORAL
Qty: 900 | Refills: 0 | Status: DISCONTINUED | OUTPATIENT
Start: 2022-07-08 | End: 2022-07-08

## 2022-07-08 RX ORDER — ATORVASTATIN CALCIUM 80 MG/1
40 TABLET, FILM COATED ORAL AT BEDTIME
Refills: 0 | Status: DISCONTINUED | OUTPATIENT
Start: 2022-07-08 | End: 2022-07-09

## 2022-07-08 RX ORDER — POTASSIUM PHOSPHATE, MONOBASIC POTASSIUM PHOSPHATE, DIBASIC 236; 224 MG/ML; MG/ML
15 INJECTION, SOLUTION INTRAVENOUS ONCE
Refills: 0 | Status: COMPLETED | OUTPATIENT
Start: 2022-07-08 | End: 2022-07-08

## 2022-07-08 RX ORDER — DEXTROSE 50 % IN WATER 50 %
15 SYRINGE (ML) INTRAVENOUS ONCE
Refills: 0 | Status: DISCONTINUED | OUTPATIENT
Start: 2022-07-08 | End: 2022-07-12

## 2022-07-08 RX ORDER — FENTANYL CITRATE 50 UG/ML
25 INJECTION INTRAVENOUS ONCE
Refills: 0 | Status: DISCONTINUED | OUTPATIENT
Start: 2022-07-08 | End: 2022-07-08

## 2022-07-08 RX ORDER — FUROSEMIDE 40 MG
10 TABLET ORAL ONCE
Refills: 0 | Status: COMPLETED | OUTPATIENT
Start: 2022-07-08 | End: 2022-07-08

## 2022-07-08 RX ORDER — INSULIN LISPRO 100/ML
VIAL (ML) SUBCUTANEOUS
Refills: 0 | Status: DISCONTINUED | OUTPATIENT
Start: 2022-07-08 | End: 2022-07-09

## 2022-07-08 RX ORDER — HYDROMORPHONE HYDROCHLORIDE 2 MG/ML
0.5 INJECTION INTRAMUSCULAR; INTRAVENOUS; SUBCUTANEOUS ONCE
Refills: 0 | Status: DISCONTINUED | OUTPATIENT
Start: 2022-07-08 | End: 2022-07-08

## 2022-07-08 RX ADMIN — Medication 650 MILLIGRAM(S): at 06:03

## 2022-07-08 RX ADMIN — CHLORHEXIDINE GLUCONATE 1 APPLICATION(S): 213 SOLUTION TOPICAL at 12:16

## 2022-07-08 RX ADMIN — POLYETHYLENE GLYCOL 3350 17 GRAM(S): 17 POWDER, FOR SOLUTION ORAL at 11:56

## 2022-07-08 RX ADMIN — GABAPENTIN 100 MILLIGRAM(S): 400 CAPSULE ORAL at 05:49

## 2022-07-08 RX ADMIN — POTASSIUM PHOSPHATE, MONOBASIC POTASSIUM PHOSPHATE, DIBASIC 62.5 MILLIMOLE(S): 236; 224 INJECTION, SOLUTION INTRAVENOUS at 01:42

## 2022-07-08 RX ADMIN — Medication 650 MILLIGRAM(S): at 05:48

## 2022-07-08 RX ADMIN — ATORVASTATIN CALCIUM 40 MILLIGRAM(S): 80 TABLET, FILM COATED ORAL at 22:44

## 2022-07-08 RX ADMIN — Medication 100 GRAM(S): at 01:41

## 2022-07-08 RX ADMIN — FENTANYL CITRATE 25 MICROGRAM(S): 50 INJECTION INTRAVENOUS at 05:45

## 2022-07-08 RX ADMIN — Medication 650 MILLIGRAM(S): at 17:29

## 2022-07-08 RX ADMIN — Medication 325 MILLIGRAM(S): at 11:55

## 2022-07-08 RX ADMIN — AMIODARONE HYDROCHLORIDE 600 MILLIGRAM(S): 400 TABLET ORAL at 20:47

## 2022-07-08 RX ADMIN — Medication 500 MILLIGRAM(S): at 17:29

## 2022-07-08 RX ADMIN — AMIODARONE HYDROCHLORIDE 600 MILLIGRAM(S): 400 TABLET ORAL at 21:37

## 2022-07-08 RX ADMIN — NICARDIPINE HYDROCHLORIDE 15 MG/HR: 30 CAPSULE, EXTENDED RELEASE ORAL at 09:00

## 2022-07-08 RX ADMIN — Medication 50 MILLIEQUIVALENT(S): at 21:26

## 2022-07-08 RX ADMIN — GABAPENTIN 100 MILLIGRAM(S): 400 CAPSULE ORAL at 22:44

## 2022-07-08 RX ADMIN — Medication 100 MILLIGRAM(S): at 00:00

## 2022-07-08 RX ADMIN — Medication 100 MILLIGRAM(S): at 18:12

## 2022-07-08 RX ADMIN — Medication 100 MILLIEQUIVALENT(S): at 02:58

## 2022-07-08 RX ADMIN — Medication 100 MILLIGRAM(S): at 08:00

## 2022-07-08 RX ADMIN — Medication 10 MILLIGRAM(S): at 04:26

## 2022-07-08 RX ADMIN — Medication 20 MILLIGRAM(S): at 15:02

## 2022-07-08 RX ADMIN — OXYCODONE HYDROCHLORIDE 10 MILLIGRAM(S): 5 TABLET ORAL at 12:30

## 2022-07-08 RX ADMIN — GABAPENTIN 100 MILLIGRAM(S): 400 CAPSULE ORAL at 13:42

## 2022-07-08 RX ADMIN — HYDROMORPHONE HYDROCHLORIDE 0.5 MILLIGRAM(S): 2 INJECTION INTRAMUSCULAR; INTRAVENOUS; SUBCUTANEOUS at 01:12

## 2022-07-08 RX ADMIN — AMIODARONE HYDROCHLORIDE 400 MILLIGRAM(S): 400 TABLET ORAL at 05:48

## 2022-07-08 RX ADMIN — OXYCODONE HYDROCHLORIDE 10 MILLIGRAM(S): 5 TABLET ORAL at 07:32

## 2022-07-08 RX ADMIN — OXYCODONE HYDROCHLORIDE 10 MILLIGRAM(S): 5 TABLET ORAL at 12:00

## 2022-07-08 RX ADMIN — Medication 25 GRAM(S): at 21:26

## 2022-07-08 RX ADMIN — FENTANYL CITRATE 50 MICROGRAM(S): 50 INJECTION INTRAVENOUS at 02:17

## 2022-07-08 RX ADMIN — MILRINONE LACTATE 10.8 MICROGRAM(S)/KG/MIN: 1 INJECTION, SOLUTION INTRAVENOUS at 07:55

## 2022-07-08 RX ADMIN — Medication 100 MILLIEQUIVALENT(S): at 03:30

## 2022-07-08 RX ADMIN — Medication 650 MILLIGRAM(S): at 11:55

## 2022-07-08 RX ADMIN — Medication 500 MILLIGRAM(S): at 05:49

## 2022-07-08 RX ADMIN — FENTANYL CITRATE 25 MICROGRAM(S): 50 INJECTION INTRAVENOUS at 05:26

## 2022-07-08 RX ADMIN — Medication 10 MILLIGRAM(S): at 15:03

## 2022-07-08 RX ADMIN — PANTOPRAZOLE SODIUM 40 MILLIGRAM(S): 20 TABLET, DELAYED RELEASE ORAL at 11:56

## 2022-07-08 RX ADMIN — FENTANYL CITRATE 50 MICROGRAM(S): 50 INJECTION INTRAVENOUS at 02:07

## 2022-07-08 RX ADMIN — SODIUM CHLORIDE 500 MILLILITER(S): 9 INJECTION, SOLUTION INTRAVENOUS at 02:27

## 2022-07-08 RX ADMIN — HYDROMORPHONE HYDROCHLORIDE 0.5 MILLIGRAM(S): 2 INJECTION INTRAMUSCULAR; INTRAVENOUS; SUBCUTANEOUS at 15:34

## 2022-07-08 RX ADMIN — HYDROMORPHONE HYDROCHLORIDE 0.5 MILLIGRAM(S): 2 INJECTION INTRAMUSCULAR; INTRAVENOUS; SUBCUTANEOUS at 16:04

## 2022-07-08 RX ADMIN — Medication 100 MILLIEQUIVALENT(S): at 14:32

## 2022-07-08 RX ADMIN — HYDROMORPHONE HYDROCHLORIDE 0.5 MILLIGRAM(S): 2 INJECTION INTRAMUSCULAR; INTRAVENOUS; SUBCUTANEOUS at 11:22

## 2022-07-08 RX ADMIN — Medication 650 MILLIGRAM(S): at 17:49

## 2022-07-08 RX ADMIN — HYDROMORPHONE HYDROCHLORIDE 0.5 MILLIGRAM(S): 2 INJECTION INTRAMUSCULAR; INTRAVENOUS; SUBCUTANEOUS at 10:52

## 2022-07-08 RX ADMIN — AMIODARONE HYDROCHLORIDE 400 MILLIGRAM(S): 400 TABLET ORAL at 17:29

## 2022-07-08 RX ADMIN — HYDROMORPHONE HYDROCHLORIDE 0.5 MILLIGRAM(S): 2 INJECTION INTRAMUSCULAR; INTRAVENOUS; SUBCUTANEOUS at 01:30

## 2022-07-08 RX ADMIN — OXYCODONE HYDROCHLORIDE 10 MILLIGRAM(S): 5 TABLET ORAL at 07:02

## 2022-07-08 RX ADMIN — Medication 650 MILLIGRAM(S): at 12:25

## 2022-07-08 RX ADMIN — Medication 100 MILLIGRAM(S): at 21:25

## 2022-07-08 NOTE — PROGRESS NOTE ADULT - SUBJECTIVE AND OBJECTIVE BOX
HPI:  This is a 70 yo male PMH DM2 A1C 5.7, former smoker 30 pack yrs, bilat carotid stenosis,  HTN, HLD who presented to cardiology, Dr. BAUDILIO Briceno, for evaluation of left shoulder pain.  Denies chest pain/pressure, SOB/FONSECA, dizziness, diaphoresis, palpitations, nausea, vomiting, peripheral edema, recent weight gain, or syncope.  No implanted monitoring devices. NST 1/8/2021 EF 61% septal and apical moderate perfusion defect, moderate inferior defect.  Pt. presents asymptomatic for further evaluation and cardiac cath.  (06 Jul 2022 08:20)      Patient seen and examined at the bedside.    Remained critically ill on continuous ICU monitoring.    OBJECTIVE:  Vital Signs Last 24 Hrs  T(C): 37.1 (08 Jul 2022 08:00), Max: 37.4 (07 Jul 2022 20:00)  T(F): 98.8 (08 Jul 2022 08:00), Max: 99.3 (07 Jul 2022 20:00)  HR: 101 (08 Jul 2022 09:15) (86 - 109)  BP: --  BP(mean): --  RR: 28 (08 Jul 2022 09:15) (11 - 33)  SpO2: 96% (08 Jul 2022 09:15) (93% - 100%)    Parameters below as of 08 Jul 2022 09:15  Patient On (Oxygen Delivery Method): nasal cannula  O2 Flow (L/min): 6      Physical Exam:   General: recently extubated   Neurology: Sedated   Eyes: bilateral pupils equal and reactive   ENT/Neck: Neck supple, trachea midline, No JVD   Respiratory: Clear bilaterally   CV: S1S2, no murmurs        [x] Sternal dressing, [x] Mediastinal CT, [x] Pleural CT        [x] Sinus rhythm  Abdominal: Soft, NT, ND +BS  Extremities: 1-2+ pedal edema noted, + peripheral pulses, + R fem IABP  Skin: No Rashes, Hematoma, Ecchymosis                           Assessment:  71 yr M DM2, HLD, HTN, CAD, and B/L Carotid stenosis.     CAD S/P CABG x3 w/ intra op IABP on 7/7/22  Cardiogenic shock   Biventricular systolic dysfunction  Mitral / Tricuspid regurgitation  Intra op bleeding S/P multiple products  Acute blood loss anemia     Plan:   ***Neuro***  Addressed analgesic regimen to optimize function and sedated with IV Precedex for ventilatory synchrony.     ***Cardiovascular***  Patient with history of coronary artery disease, subsequently underwent coronary artery bypass grafting x3 on 7/7/22. Patient had a right femoral intra op IABP 1:2 with good diastolic augmentation placed. Patient is requiring inotropic support with IV Primacor infusion for systolic heart failure. Invasive hemodynamic monitoring with a central venous catheter & an A-line were required for the continuous central venous and MAP/BP monitoring to ensure adequate cardiovascular support. ASA continued for graft occlusion-thromboembolism prophylaxis. Lipitor was also continued for long term graft patency. IV Nicardipine for blood pressure management. PO Amiodarone for atrial fibrillation prophylaxis. Patient received lactated ringers for volume resuscitation yesterday.         ***Pulmonary***  Patient extubated today. Respiratory status required supplemental oxygen with nasal cannula 6 LPM, close monitoring of breathing pattern and respiratory rate & the following of continuous pulse oximetry for support & to prevent decompensation.     Mode: CPAP with PS  TV (machine): 570  FiO2: 50  PEEP: 5  PS: 5  MAP: 10  PIP: 20               ***Heme***  Anemia due to acute blood loss requiring 2 units of packed red blood cells, 10 of cryoprecipitate, 2 of FEIBA, and 2 fresh frozen plasma yesterday. Continue to monitor hemoglobin and hematocrit levels.       ***GI***  Tolerating clear liquid consistent carb diet. Continue Protonix for stress ulcer prophylaxis. Bowel regimen with Miralax and senna.       ***Renal***  Optimize renal perfusion with adequate volume resuscitation and continued monitoring of urine output, fluid balance, electrolytes, and BUN/Creatinine.        ***ID***  Afebrile, white blood cells within normal limits. Continue trending white blood count and monitoring fever curve. Continue Cefuroxime for perioperative antibiotic coverage.       ***Endocrine***  Metabolic stability, history of diabetes mellitus required an IV regular Insulin drip and insulin sliding scale while following serial glucose levels to help achieve and maintain euglycemia.            Patient requires continuous monitoring with bedside rhythm monitoring, pulse oximetry monitoring, and continuous central venous and arterial pressure monitoring; and intermittent blood gas analysis. Care plan discussed with the ICU care team.   Patient remained critical, at risk for life threatening decompensation.    I have spent 30 minutes providing critical care management to this patient.    By signing my name below, I, Tereza Chavez, attest that this documentation has been prepared under the direction and in the presence of Laila Beauchamp MD   Electronically signed: Christiano Urbina, 07-08-22 @ 09:20    I, Laila Beauchamp, personally performed the services described in this documentation. all medical record entries made by the scribe were at my direction and in my presence. I have reviewed the chart and agree that the record reflects my personal performance and is accurate and complete  Electronically signed: Laila Beauchamp MD  HPI:  This is a 72 yo male PMH DM2 A1C 5.7, former smoker 30 pack yrs, bilat carotid stenosis,  HTN, HLD who presented to cardiology, Dr. BAUDILIO Briceno, for evaluation of left shoulder pain.  Denies chest pain/pressure, SOB/FONSECA, dizziness, diaphoresis, palpitations, nausea, vomiting, peripheral edema, recent weight gain, or syncope.  No implanted monitoring devices. NST 1/8/2021 EF 61% septal and apical moderate perfusion defect, moderate inferior defect.  Pt. presents asymptomatic for further evaluation and cardiac cath.  (06 Jul 2022 08:20)      Patient seen and examined at the bedside.    Remained critically ill on continuous ICU monitoring.    OBJECTIVE:  Vital Signs Last 24 Hrs  T(C): 37.1 (08 Jul 2022 08:00), Max: 37.4 (07 Jul 2022 20:00)  T(F): 98.8 (08 Jul 2022 08:00), Max: 99.3 (07 Jul 2022 20:00)  HR: 101 (08 Jul 2022 09:15) (86 - 109)  BP: --  BP(mean): --  RR: 28 (08 Jul 2022 09:15) (11 - 33)  SpO2: 96% (08 Jul 2022 09:15) (93% - 100%)    Parameters below as of 08 Jul 2022 09:15  Patient On (Oxygen Delivery Method): nasal cannula  O2 Flow (L/min): 6      Physical Exam:   General: recently extubated, breathing comfortably  Neurology: Awake and following commands, no focal deficit  Eyes: bilateral pupils equal and reactive   ENT/Neck: Neck supple, trachea midline, No JVD   Respiratory: Clear bilaterally   CV: S1S2, no murmurs        [x] Sternal dressing, [x] Mediastinal CT, [x] Pleural CT        [x] Sinus rhythm  Abdominal: Soft, NT, ND +BS  Extremities: trace pedal edema noted, + peripheral pulses  Skin: No Rashes, Hematoma, Ecchymosis                           Assessment:  71 yr M DM2, HLD, HTN, CAD, and B/L Carotid stenosis.     CAD S/P CABG x3 w/ intra op IABP on 7/7/22  Cardiogenic shock   Biventricular systolic dysfunction  Mitral / Tricuspid regurgitation  Intra op bleeding S/P multiple products  Acute blood loss anemia     Plan:   ***Neuro***  Addressed analgesic regimen to optimize function.    ***Cardiovascular***  Patient with history of coronary artery disease, subsequently underwent coronary artery bypass grafting x3 on 7/7/22. Patient had a right femoral intra op IABP 1:1 with good diastolic augmentation placed. IABP weaned this morning and removed. Patient is requiring inotropic support with IV Primacor infusion for acute on chronic systolic heart failure as well as nicardipine for hypertension and afterload reduction. Invasive hemodynamic monitoring with a central venous catheter & an A-line were required for the continuous central venous and MAP/BP monitoring to ensure adequate cardiovascular support. ASA continued for graft occlusion-thromboembolism prophylaxis. Lipitor was also continued for long term graft patency. PO Amiodarone for atrial fibrillation prophylaxis.      ***Pulmonary***  Patient extubated today. Respiratory status required supplemental oxygen with nasal cannula 6 LPM, close monitoring of breathing pattern and respiratory rate & the following of continuous pulse oximetry for support & to prevent decompensation.                  ***Heme***  Anemia due to acute blood loss requiring 2 units of packed red blood cells, 10 of cryoprecipitate, 2 of FEIBA, and 2 fresh frozen plasma yesterday. Continue to monitor hemoglobin and hematocrit levels.       ***GI***  Tolerating clear liquid consistent carb diet. Continue Protonix for stress ulcer prophylaxis. Bowel regimen with Miralax and senna.       ***Renal***  Optimize renal perfusion with adequate volume resuscitation and continued monitoring of urine output, fluid balance, electrolytes, and BUN/Creatinine.        ***ID***  Afebrile, white blood cells within normal limits. Continue trending white blood count and monitoring fever curve. Continue Cefuroxime for perioperative antibiotic coverage.       ***Endocrine***  Metabolic stability, history of diabetes mellitus required an IV regular Insulin drip and insulin sliding scale while following serial glucose levels to help achieve and maintain euglycemia.            Patient requires continuous monitoring with bedside rhythm monitoring, pulse oximetry monitoring, and continuous central venous and arterial pressure monitoring; and intermittent blood gas analysis. Care plan discussed with the ICU care team.   Patient remained critical, at risk for life threatening decompensation.    I have spent 30 minutes providing critical care management to this patient.    By signing my name below, I, Tereza Chavez, attest that this documentation has been prepared under the direction and in the presence of Laila Beauchamp MD   Electronically signed: Sukh Urbina, 07-08-22 @ 09:20    I, Laila Beauchamp, personally performed the services described in this documentation. all medical record entries made by the sukh were at my direction and in my presence. I have reviewed the chart and agree that the record reflects my personal performance and is accurate and complete  Electronically signed: Laila Beauchamp MD  HPI:  This is a 72 yo male PMH DM2 A1C 5.7, former smoker 30 pack yrs, bilat carotid stenosis,  HTN, HLD who presented to cardiology, Dr. BAUDILIO Briceno, for evaluation of left shoulder pain.  Denies chest pain/pressure, SOB/FONSECA, dizziness, diaphoresis, palpitations, nausea, vomiting, peripheral edema, recent weight gain, or syncope.  No implanted monitoring devices. NST 1/8/2021 EF 61% septal and apical moderate perfusion defect, moderate inferior defect.  Pt. presents asymptomatic for further evaluation and cardiac cath.  (06 Jul 2022 08:20)      Patient seen and examined at the bedside.    Remained critically ill on continuous ICU monitoring.    OBJECTIVE:  Vital Signs Last 24 Hrs  T(C): 37.1 (08 Jul 2022 08:00), Max: 37.4 (07 Jul 2022 20:00)  T(F): 98.8 (08 Jul 2022 08:00), Max: 99.3 (07 Jul 2022 20:00)  HR: 101 (08 Jul 2022 09:15) (86 - 109)  BP: --  BP(mean): --  RR: 28 (08 Jul 2022 09:15) (11 - 33)  SpO2: 96% (08 Jul 2022 09:15) (93% - 100%)    Parameters below as of 08 Jul 2022 09:15  Patient On (Oxygen Delivery Method): nasal cannula  O2 Flow (L/min): 6      Physical Exam:   General: recently extubated, breathing comfortably  Neurology: Awake and following commands, no focal deficit  Eyes: bilateral pupils equal and reactive   ENT/Neck: Neck supple, trachea midline, No JVD   Respiratory: Clear bilaterally   CV: S1S2, no murmurs        [x] Sternal dressing, [x] Mediastinal CT, [x] Pleural CT        [x] Sinus rhythm  Abdominal: Soft, NT, ND +BS  Extremities: trace pedal edema noted, + peripheral pulses  Skin: No Rashes, Hematoma, Ecchymosis                           Assessment:  71 yr M DM2, HLD, HTN, CAD, and B/L Carotid stenosis.     CAD S/P CABG x3 w/ intra op IABP on 7/7/22  Cardiogenic shock   Biventricular systolic dysfunction  Mitral / Tricuspid regurgitation  Intra op bleeding S/P multiple products  Acute blood loss anemia     Plan:   ***Neuro***  Addressed analgesic regimen to optimize function.    ***Cardiovascular***  Patient with history of coronary artery disease, subsequently underwent coronary artery bypass grafting x3 on 7/7/22. Patient had a right femoral intra op IABP 1:1 with good diastolic augmentation placed. IABP weaned this morning and removed. Patient is requiring inotropic support with IV Primacor infusion for acute on chronic systolic heart failure as well as nicardipine for hypertension and afterload reduction. Invasive hemodynamic monitoring with a central venous catheter & an A-line were required for the continuous central venous and MAP/BP monitoring to ensure adequate cardiovascular support. ASA continued for graft occlusion-thromboembolism prophylaxis. Lipitor was also continued for long term graft patency. PO Amiodarone for atrial fibrillation prophylaxis.      ***Pulmonary***  Patient extubated today. Respiratory status required supplemental oxygen with nasal cannula 6 LPM, close monitoring of breathing pattern and respiratory rate & the following of continuous pulse oximetry for support & to prevent decompensation.  Pain control and incentive spirometry to prevent atelectasis.                 ***Heme***  Anemia due to acute blood loss requiring 2 units of packed red blood cells, 10 of cryoprecipitate, 2 of FEIBA, and 2 fresh frozen plasma yesterday. Continue to monitor hemoglobin and hematocrit levels.       ***GI***  Clear liquid consistent carb diet initiated. Continue Protonix for stress ulcer prophylaxis. Bowel regimen with Miralax and senna.       ***Renal***  Optimize renal perfusion with adequate volume resuscitation and continued monitoring of urine output, fluid balance, electrolytes, and BUN/Creatinine.        ***ID***  Afebrile, white blood cells within normal limits. Continue trending white blood count and monitoring fever curve. Continue Cefuroxime for perioperative antibiotic coverage.       ***Endocrine***  Metabolic stability, history of diabetes mellitus required an IV regular Insulin drip and insulin sliding scale while following serial glucose levels to help achieve and maintain euglycemia.            Patient requires continuous monitoring with bedside rhythm monitoring, pulse oximetry monitoring, and continuous central venous and arterial pressure monitoring; and intermittent blood gas analysis. Care plan discussed with the ICU care team.   Patient remained critical, at risk for life threatening decompensation.    I have spent 40 minutes providing critical care management to this patient.    By signing my name below, I, Tereza Chavez, attest that this documentation has been prepared under the direction and in the presence of Laila Beauchamp MD   Electronically signed: Christiano Urbina, 07-08-22 @ 09:20    I, Laila Beauchamp, personally performed the services described in this documentation. all medical record entries made by the fordibe were at my direction and in my presence. I have reviewed the chart and agree that the record reflects my personal performance and is accurate and complete  Electronically signed: Laila Beauchamp MD

## 2022-07-08 NOTE — PATIENT PROFILE ADULT - FALL HARM RISK - RISK INTERVENTIONS

## 2022-07-08 NOTE — AIRWAY REMOVAL NOTE  ADULT & PEDS - ARTIFICAL AIRWAY REMOVAL COMMENTS
Written order for extubation verified. The patient was identified by full name and birth date compared to the identification band. Present during the procedure was Neema Chaves RN.

## 2022-07-08 NOTE — CONSULT NOTE ADULT - SUBJECTIVE AND OBJECTIVE BOX
HPI: Mr. Golden is a 71 year-old man with history of multiple medical issues including hypertension, type 2 diabetes mellitus, and hyperlipidemia. He presented on 22 for scheduled cardiac cath, s/p; recent onset of left shoulder pain.   He was noted to have triple-vessel disease. He is now s/p CABG x 3 22 (bypass time 90min, aortic cross clamp time 50min). He is now on Milrinone, Nicardipine, and Insulin gtts; on IABP 1:2. Renal consultation requested for evaluation of POLLO.    I see that his urine output was down to 30-35cc/24h overnight; increased to 100-150cc/h, s/p Lasix 10mg iv x 1.         PAST MEDICAL & SURGICAL HISTORY:  HTN (hypertension)  HLD (hyperlipidemia)  Diabetes mellitus  Carotid stenosis b/l  Vitiligo  No significant past surgical history    Allergies  No Known Allergies    SOCIAL HISTORY:  (+)ex-smoker - 30 pack years    FAMILY HISTORY:  No pertinent family history in first degree relatives    REVIEW OF SYSTEMS:  CONSTITUTIONAL: No weakness, fevers or chills  EYES/ENT: No visual changes;  No vertigo or throat pain   NECK: No pain or stiffness  RESPIRATORY: No cough, wheezing, hemoptysis; No shortness of breath  CARDIOVASCULAR: No chest pain or palpitations  GASTROINTESTINAL: No abdominal or epigastric pain. No nausea, vomiting, or hematemesis; No diarrhea or constipation. No melena or hematochezia.  GENITOURINARY: No dysuria, frequency or hematuria  NEUROLOGICAL: No numbness or weakness  SKIN: No itching, burning, rashes, or lesions   All other review of systems is negative unless indicated above.    VITAL:  T(C): , Max: 37.4 (22 @ 20:00)  T(F): , Max: 99.3 (22 @ 20:00)  HR: 98 (22 @ 08:15)  BP: 132/43  RR: 27 (22 @ 08:15)  SpO2: 95% (22 @ 08:15)  I/O 4089/2250/24h    PHYSICAL EXAM:  Constitutional: NAD, Alert  HEENT: NCAT, MMM  Neck: Supple, No JVD  Respiratory: CTA-b/l  Cardiovascular: RRR s1s2, no m/r/g; (+)IABP 1:2  Gastrointestinal: BS+, soft, NT/ND  : (+)bustos  Extremities: No peripheral edema b/l  Neurological: no focal deficits; strength grossly intact  Back: no CVAT b/l  Skin: No rashes, no nevi    LABS:                        9.1    6.88  )-----------( 145      ( 2022 00:16 )             27.3     Na(147)/K(4.1)/Cl(108)/HCO3(23)/BUN(18)/Cr(1.02)Glu(133)/Ca(8.9)/Mg(2.0)/PO4(2.8)     @ 00:16  Na(148)/K(4.3)/Cl(107)/HCO3(24)/BUN(16)/Cr(0.91)Glu(134)/Ca(8.8)/Mg(--)/PO4(--)     @ 15:19  Na(138)/K(4.7)/Cl(103)/HCO3(24)/BUN(19)/Cr(0.94)Glu(122)/Ca(8.9)/Mg(1.9)/PO4(2.9)    07 @ 10:28  Na(139)/K(4.6)/Cl(103)/HCO3(26)/BUN(20)/Cr(1.02)Glu(143)/Ca(9.2)/Mg(--)/PO4(--)     @ 08:23    Urinalysis Basic - ( 2022 15:33 )  Color: Light Yellow / Appearance: Clear / S.021 / pH: x  Gluc: x / Ketone: Negative  / Bili: Negative / Urobili: Negative   Blood: x / Protein: Negative / Nitrite: Negative   Leuk Esterase: Negative / RBC: x / WBC x   Sq Epi: x / Non Sq Epi: x / Bacteria: x      IMAGING:  < from: Xray Chest 1 View- PORTABLE-Urgent (Xray Chest 1 View- PORTABLE-Urgent .) (22 @ 12:59) >  Clear lungs.      ASSESSMENT:  (1)Renal - nonproteinuric CKD3a; solitary kidney. Mild POLLO; prerenally mediated  (2)Hypernatremia - mild/not overly concerning for now  (3)CV - s/p CABGx3 - postop cardiogenic shock; being weaned off IABP    RECOMMEND:  (1)No standing IVF  (2)IV Lasix intermittent doses prn urine output <40cc/h  (3)Weaning off IABP per [facility]ICU  (4)Dose new meds for GFR 50ml/min  (5)BMP+Mg+PO4 q12h    Thank you for involving [facility] in this patient's care.    With warm regards,    Elton Farr MD   [facility]  Office: [phone]  Cell: [phone]               HPI: Mr. Golden is a 71 year-old man with history of multiple medical issues including hypertension, type 2 diabetes mellitus, and hyperlipidemia. He presented on 22 for scheduled cardiac cath, s/p; recent onset of left shoulder pain.   He was noted to have triple-vessel disease. He is now s/p CABG x 3 22 (bypass time 90min, aortic cross clamp time 50min). He is now on Milrinone, Nicardipine, and Insulin gtts; on IABP 1:2. Renal consultation requested for evaluation of POLLO.    I see that his urine output was down to 30-35cc/24h overnight; increased to 100-150cc/h, s/p Lasix 10mg iv x 1.    Patient feels well; no chest pain, dizziness, palpitations, or shortness of breath. Denies prior history of CKD/POLLO.       PAST MEDICAL & SURGICAL HISTORY:  HTN (hypertension)  HLD (hyperlipidemia)  Diabetes mellitus  Carotid stenosis b/l  Vitiligo  No significant past surgical history    Allergies  No Known Allergies    SOCIAL HISTORY:  (+)ex-smoker - 30 pack years    FAMILY HISTORY:  No pertinent family history in first degree relatives    REVIEW OF SYSTEMS:  CONSTITUTIONAL: No weakness, fevers or chills  EYES/ENT: No visual changes;  No vertigo or throat pain   NECK: No pain or stiffness  RESPIRATORY: No cough, wheezing, hemoptysis; No shortness of breath  CARDIOVASCULAR: No chest pain or palpitations  GASTROINTESTINAL: No abdominal or epigastric pain. No nausea, vomiting, or hematemesis; No diarrhea or constipation. No melena or hematochezia.  GENITOURINARY: No dysuria, frequency or hematuria  NEUROLOGICAL: No numbness or weakness  SKIN: No itching, burning, rashes, or lesions   All other review of systems is negative unless indicated above.    VITAL:  T(C): , Max: 37.4 (22 @ 20:00)  T(F): , Max: 99.3 (22 @ 20:00)  HR: 98 (22 @ 08:15)  BP: 132/43  RR: 27 (22 @ 08:15)  SpO2: 95% (22 @ 08:15)  I/O 4089/2250/24h    PHYSICAL EXAM:  Constitutional: NAD, Alert  HEENT: NCAT, MMM  Neck: Supple, No JVD  Respiratory: coarse BS; (+)chest tube x 2  Cardiovascular: RRR s1s2, no m/r/g; (+)IABP 1:2  Gastrointestinal: BS+, soft, NT/ND  : (+)bustos  Extremities: no edema; RLE dressed with ACE  Neurological: no focal deficits; strength grossly intact  Back: no CVAT b/l  Skin: No rashes, no nevi    LABS:                        9.1    6.88  )-----------( 145      ( 2022 00:16 )             27.3     Na(147)/K(4.1)/Cl(108)/HCO3(23)/BUN(18)/Cr(1.02)Glu(133)/Ca(8.9)/Mg(2.0)/PO4(2.8)     @ 00:16  Na(148)/K(4.3)/Cl(107)/HCO3(24)/BUN(16)/Cr(0.91)Glu(134)/Ca(8.8)/Mg(--)/PO4(--)     @ 15:19  Na(138)/K(4.7)/Cl(103)/HCO3(24)/BUN(19)/Cr(0.94)Glu(122)/Ca(8.9)/Mg(1.9)/PO4(2.9)     @ 10:28  Na(139)/K(4.6)/Cl(103)/HCO3(26)/BUN(20)/Cr(1.02)Glu(143)/Ca(9.2)/Mg(--)/PO4(--)     @ 08:23    Urinalysis Basic - ( 2022 15:33 )  Color: Light Yellow / Appearance: Clear / S.021 / pH: x  Gluc: x / Ketone: Negative  / Bili: Negative / Urobili: Negative   Blood: x / Protein: Negative / Nitrite: Negative   Leuk Esterase: Negative / RBC: x / WBC x   Sq Epi: x / Non Sq Epi: x / Bacteria: x      IMAGING:  < from: Xray Chest 1 View- PORTABLE-Urgent (Xray Chest 1 View- PORTABLE-Urgent .) (22 @ 12:59) >  Clear lungs.      ASSESSMENT:  (1)Renal - nonproteinuric CKD3a; solitary kidney. Mild POLLO; prerenally mediated  (2)Hypernatremia - mild/not overly concerning for now  (3)CV - s/p CABGx3 - postop cardiogenic shock; being weaned off IABP    RECOMMEND:  (1)No standing IVF  (2)IV Lasix intermittent doses prn urine output <40cc/h  (3)Weaning off IABP per [facility]ICU  (4)Dose new meds for GFR 50ml/min    Please reconsult as needed    Thank you for involving [facility] in this patient's care.    With warm regards,    Elton Farr MD   [facility]  Office: [phone]  Cell: [phone]               HPI: Mr. Golden is a 71 year-old man with history of multiple medical issues including hypertension, type 2 diabetes mellitus, and hyperlipidemia. He presented on 22 for scheduled cardiac cath, s/p; recent onset of left shoulder pain.   He was noted to have triple-vessel disease. He is now s/p CABG x 3 22 (bypass time 90min, aortic cross clamp time 50min). He is now on Milrinone, Nicardipine, and Insulin gtts; on IABP 1:2. Renal consultation requested for evaluation of POLLO.    I see that his urine output was down to 30-35cc/24h overnight; increased to 100-150cc/h, s/p Lasix 10mg iv x 1.    Patient feels well; no chest pain, dizziness, palpitations, or shortness of breath. Denies prior history of CKD/POLLO.       PAST MEDICAL & SURGICAL HISTORY:  HTN (hypertension)  HLD (hyperlipidemia)  Diabetes mellitus  Carotid stenosis b/l  Vitiligo  No significant past surgical history    Allergies  No Known Allergies    SOCIAL HISTORY:  (+)ex-smoker - 30 pack years    FAMILY HISTORY:  No pertinent family history in first degree relatives    REVIEW OF SYSTEMS:  CONSTITUTIONAL: No weakness, fevers or chills  EYES/ENT: No visual changes;  No vertigo or throat pain   NECK: No pain or stiffness  RESPIRATORY: No cough, wheezing, hemoptysis; No shortness of breath  CARDIOVASCULAR: No chest pain or palpitations  GASTROINTESTINAL: No abdominal or epigastric pain. No nausea, vomiting, or hematemesis; No diarrhea or constipation. No melena or hematochezia.  GENITOURINARY: No dysuria, frequency or hematuria  NEUROLOGICAL: No numbness or weakness  SKIN: No itching, burning, rashes, or lesions   All other review of systems is negative unless indicated above.    VITAL:  T(C): , Max: 37.4 (22 @ 20:00)  T(F): , Max: 99.3 (22 @ 20:00)  HR: 98 (22 @ 08:15)  BP: 132/43  RR: 27 (22 @ 08:15)  SpO2: 95% (22 @ 08:15)  I/O 4089/2250/24h    PHYSICAL EXAM:  Constitutional: NAD, Alert  HEENT: NCAT, MMM  Neck: Supple, No JVD  Respiratory: coarse BS; (+)chest tube x 2  Cardiovascular: RRR s1s2, no m/r/g; (+)IABP 1:2  Gastrointestinal: BS+, soft, NT/ND  : (+)bustos  Extremities: no edema; RLE dressed with ACE  Neurological: no focal deficits; strength grossly intact  Back: no CVAT b/l  Skin: No rashes, no nevi    LABS:                        9.1    6.88  )-----------( 145      ( 2022 00:16 )             27.3     Na(147)/K(4.1)/Cl(108)/HCO3(23)/BUN(18)/Cr(1.02)Glu(133)/Ca(8.9)/Mg(2.0)/PO4(2.8)     @ 00:16  Na(148)/K(4.3)/Cl(107)/HCO3(24)/BUN(16)/Cr(0.91)Glu(134)/Ca(8.8)/Mg(--)/PO4(--)     @ 15:19  Na(138)/K(4.7)/Cl(103)/HCO3(24)/BUN(19)/Cr(0.94)Glu(122)/Ca(8.9)/Mg(1.9)/PO4(2.9)     @ 10:28  Na(139)/K(4.6)/Cl(103)/HCO3(26)/BUN(20)/Cr(1.02)Glu(143)/Ca(9.2)/Mg(--)/PO4(--)     @ 08:23    Urinalysis Basic - ( 2022 15:33 )  Color: Light Yellow / Appearance: Clear / S.021 / pH: x  Gluc: x / Ketone: Negative  / Bili: Negative / Urobili: Negative   Blood: x / Protein: Negative / Nitrite: Negative   Leuk Esterase: Negative / RBC: x / WBC x   Sq Epi: x / Non Sq Epi: x / Bacteria: x      IMAGING:  < from: Xray Chest 1 View- PORTABLE-Urgent (Xray Chest 1 View- PORTABLE-Urgent .) (22 @ 12:59) >  Clear lungs.      ASSESSMENT:  (1)Renal - nonproteinuric CKD3a. Mild POLLO; prerenally mediated  (2)Hypernatremia - mild/not overly concerning for now  (3)CV - s/p CABGx3 - postop cardiogenic shock; being weaned off IABP    RECOMMEND:  (1)No standing IVF  (2)IV Lasix intermittent doses prn urine output <40cc/h  (3)Weaning off IABP per [facility]ICU  (4)Dose new meds for GFR 50ml/min    Please reconsult as needed    Thank you for involving [facility] in this patient's care.    With warm regards,    Elton Farr MD   [facility]  Office: [phone]  Cell: [phone]

## 2022-07-08 NOTE — CHART NOTE - NSCHARTNOTEFT_GEN_A_CORE
PROCEDURE NOTE  REMOVAL OF INTRA AORTIC BALLOON PUMP performed at 1133    The IABP (intra-aortic balloon pump) was weaned according to protocol.  Hemodynamics remained stable.  Pulses in the Right lower extremity are palpable.  The patient was placed in the supine position.  The insertion site was identified and the sutures were removed.  The IABP was turned off and the balloon deflated.  The IABP was then removed.  Direct pressure was applied for 30 minutes.  A sandbag was applied and is to remain in place for four hours.    Monitoring of the Right groin and both lower extremities including neuro-vascular checks and vital signs every 15 minutes  x4, then every 30 minutes x 2, then every 1 hr x 4 was ordered.      Complications: None
6-hour post-removal of IABP evaluation      vital signs are stable, neuro-vascular status of the lower extremities is intact/stable and there is no evidence of hematoma of the Right groin.

## 2022-07-08 NOTE — PROGRESS NOTE ADULT - SUBJECTIVE AND OBJECTIVE BOX
Patient seen and examined at the bedside.    Remained critically ill on continuous ICU monitoring.    OBJECTIVE:  Vital Signs Last 24 Hrs  T(C): 37.8 (08 Jul 2022 20:00), Max: 37.8 (08 Jul 2022 20:00)  T(F): 100 (08 Jul 2022 20:00), Max: 100 (08 Jul 2022 20:00)  HR: 107 (08 Jul 2022 20:00) (86 - 120)  BP: 138/72 (08 Jul 2022 20:00) (112/66 - 139/68)  BP(mean): 99 (08 Jul 2022 20:00) (77 - 99)  RR: 29 (08 Jul 2022 20:00) (2 - 39)  SpO2: 95% (08 Jul 2022 20:00) (91% - 100%)    Parameters below as of 08 Jul 2022 20:00  Patient On (Oxygen Delivery Method): nasal cannula          Physical Exam:   General: NAD   Neurology: Awake and following commands, no focal deficit  Eyes: bilateral pupils equal and reactive   ENT/Neck: Neck supple, trachea midline, No JVD   Respiratory: Clear bilaterally   CV: S1S2, no murmurs        [x] Sternal dressing, [x] Mediastinal CT, [x] Pleural CT        [x] Sinus tachy   Abdominal: Soft, NT, ND +BS  Extremities: trace pedal edema noted, + peripheral pulses / R groin dressing in place, c/d/i  Skin: No Rashes, Hematoma, Ecchymosis                                 Assessment:  71 yr M DM2, HLD, HTN, CAD, and B/L Carotid stenosis.     CAD S/P C3L on 7/7   Severe TR, mod MR, BiV dysfunction post bypass, s/p IABP intraop (removed on 7/8)  Hemodynamic instability  Hypovolemia  Post op respiratory insufficiency  Acute blood loss anemia    Plan:   ***Neuro***  [x] Nonfocal     Post operative neuro assessment     ***Cardiovascular***  Intraop STEVE: Biventricular function improved with (LVEF estimated 50-60%) with IABP   Invasive hemodynamic monitoring, assess perfusion indices   ST / CVP 12 / MAP 71 / Hct 29.0 / Lactate 1.6   Cardene weaned to off this AM /  [x] Primacor 0.5mcg   IABP removed this AM, continue to monitor groin site closely   Continuos reassessment of hemodynamics   Afib prophylaxis with Amiodarone   Monitor chest tube outputs    [x]  ASA [x] Statin   Serial EKG and cardiac enzymes     ***Pulmonary***  [x] 6L NC   Encourage incentive spirometry, continue pulse ox monitoring, follow ABGs     ***GI***  [x] Diet: Tolerating consistent carb diet   [x] Protonix    Bowel regimen with Miralax  and Senna     ***Renal***  Continue to monitor I/Os, BUN/Creatinine.   Replete lytes PRN  Song present      ***ID***  Perioperative coverage with Cefuroxime     ***Endocrine***   [x]  DM2 : HbA1c 5.7%                - [x] Insulin gtt  [x]  ISS                - Need tight glycemic control to prevent wound infection.        Patient requires continuous monitoring with bedside rhythm monitoring, pulse oximetry monitoring, and continuous central venous and arterial pressure monitoring; and intermittent blood gas analysis. Care plan discussed with the ICU care team.   Patient remained critical, at risk for life threatening decompensation.    I have spent 30 minutes providing critical care management to this patient.    By signing my name below, I, Joy Cho, attest that this documentation has been prepared under the direction and in the presence of NILA Lott   Electronically signed: Christiano Llamas, 07-08-22 @ 20:39    I, NILA Lott , personally performed the services described in this documentation. all medical record entries made by the fordibmiroslava were at my direction and in my presence. I have reviewed the chart and agree that the record reflects my personal performance and is accurate and complete  Electronically signed: NILA Lott  Patient seen and examined at the bedside.    Remained critically ill on continuous ICU monitoring.    OBJECTIVE:  Vital Signs Last 24 Hrs  T(C): 37.8 (08 Jul 2022 20:00), Max: 37.8 (08 Jul 2022 20:00)  T(F): 100 (08 Jul 2022 20:00), Max: 100 (08 Jul 2022 20:00)  HR: 107 (08 Jul 2022 20:00) (86 - 120)  BP: 138/72 (08 Jul 2022 20:00) (112/66 - 139/68)  BP(mean): 99 (08 Jul 2022 20:00) (77 - 99)  RR: 29 (08 Jul 2022 20:00) (2 - 39)  SpO2: 95% (08 Jul 2022 20:00) (91% - 100%)    Parameters below as of 08 Jul 2022 20:00  Patient On (Oxygen Delivery Method): nasal cannula          Physical Exam:   General: NAD   Neurology: Awake and following commands, no focal deficit  Eyes: bilateral pupils equal and reactive   ENT/Neck: Neck supple, trachea midline, No JVD   Respiratory: Clear bilaterally   CV: S1S2, no murmurs        [x] Sternal dressing, [x] Mediastinal CT, [x] Pleural CT        [x] Sinus tachy   Abdominal: Soft, NT, ND +BS  Extremities: trace pedal edema noted, + peripheral pulses / R groin dressing in place, c/d/i  Skin: No Rashes, Hematoma, Ecchymosis                                 Assessment:  71 yr M DM2, HLD, HTN, CAD, and B/L Carotid stenosis.     CAD S/P C3L on 7/7   Severe TR, mod MR, BiV dysfunction post bypass, s/p IABP intraop (removed on 7/8)  Hemodynamic instability  Hypovolemia  Post op respiratory insufficiency  Acute blood loss anemia    Plan:   ***Neuro***  [x] Nonfocal     Post operative neuro assessment     ***Cardiovascular***  Intraop STEVE: Biventricular function improved with (LVEF estimated 50-60%) with IABP   Invasive hemodynamic monitoring, assess perfusion indices   ST / CVP 12 / MAP 71 / Hct 29.0 / Lactate 1.6   Cardene weaned to off this AM /  [x] Primacor 0.5mcg   IABP removed this AM, continue to monitor groin site closely   Continuos reassessment of hemodynamics   Afib prophylaxis with Amiodarone   Monitor chest tube outputs    [x]  ASA [x] Statin   Serial EKG and cardiac enzymes     **Following this note > pt went into NSVT and rapid afib > amio and lidocaine given    ***Pulmonary***  [x] 6L NC   Encourage incentive spirometry, continue pulse ox monitoring, follow ABGs   HFNC needed s/p SUMANTH    ***GI***  [x] Diet: Tolerating consistent carb diet   [x] Protonix    Bowel regimen with Miralax  and Senna     ***Renal***  Continue to monitor I/Os, BUN/Creatinine.   Replete lytes PRN  Song present      ***ID***  Perioperative coverage with Cefuroxime     ***Endocrine***   [x]  DM2 : HbA1c 5.7%                - [x]  ISS                - Need tight glycemic control to prevent wound infection.        Patient requires continuous monitoring with bedside rhythm monitoring, pulse oximetry monitoring, and continuous central venous and arterial pressure monitoring; and intermittent blood gas analysis. Care plan discussed with the ICU care team.   Patient remained critical, at risk for life threatening decompensation.    I have spent 30 minutes providing critical care management to this patient.    By signing my name below, I, Joy Cho, attest that this documentation has been prepared under the direction and in the presence of NILA Lott   Electronically signed: Sukh Llamas, 07-08-22 @ 20:39    I, NILA Lott , personally performed the services described in this documentation. all medical record entries made by the sukh were at my direction and in my presence. I have reviewed the chart and agree that the record reflects my personal performance and is accurate and complete  Electronically signed: NILA Lott

## 2022-07-09 DIAGNOSIS — I48.91 UNSPECIFIED ATRIAL FIBRILLATION: ICD-10-CM

## 2022-07-09 DIAGNOSIS — Z95.1 PRESENCE OF AORTOCORONARY BYPASS GRAFT: ICD-10-CM

## 2022-07-09 LAB
ALBUMIN SERPL ELPH-MCNC: 3.5 G/DL — SIGNIFICANT CHANGE UP (ref 3.3–5)
ALP SERPL-CCNC: 52 U/L — SIGNIFICANT CHANGE UP (ref 40–120)
ALT FLD-CCNC: 39 U/L — SIGNIFICANT CHANGE UP (ref 10–45)
ANION GAP SERPL CALC-SCNC: 10 MMOL/L — SIGNIFICANT CHANGE UP (ref 5–17)
AST SERPL-CCNC: 57 U/L — HIGH (ref 10–40)
BASE EXCESS BLDV CALC-SCNC: 0.5 MMOL/L — SIGNIFICANT CHANGE UP (ref -2–2)
BILIRUB SERPL-MCNC: 0.6 MG/DL — SIGNIFICANT CHANGE UP (ref 0.2–1.2)
BUN SERPL-MCNC: 17 MG/DL — SIGNIFICANT CHANGE UP (ref 7–23)
CALCIUM SERPL-MCNC: 8.3 MG/DL — LOW (ref 8.4–10.5)
CHLORIDE SERPL-SCNC: 103 MMOL/L — SIGNIFICANT CHANGE UP (ref 96–108)
CO2 BLDV-SCNC: 27 MMOL/L — HIGH (ref 22–26)
CO2 SERPL-SCNC: 22 MMOL/L — SIGNIFICANT CHANGE UP (ref 22–31)
CREAT SERPL-MCNC: 0.83 MG/DL — SIGNIFICANT CHANGE UP (ref 0.5–1.3)
EGFR: 94 ML/MIN/1.73M2 — SIGNIFICANT CHANGE UP
GAS PNL BLDA: SIGNIFICANT CHANGE UP
GAS PNL BLDV: SIGNIFICANT CHANGE UP
GLUCOSE BLDC GLUCOMTR-MCNC: 110 MG/DL — HIGH (ref 70–99)
GLUCOSE BLDC GLUCOMTR-MCNC: 146 MG/DL — HIGH (ref 70–99)
GLUCOSE BLDC GLUCOMTR-MCNC: 149 MG/DL — HIGH (ref 70–99)
GLUCOSE BLDC GLUCOMTR-MCNC: 153 MG/DL — HIGH (ref 70–99)
GLUCOSE BLDC GLUCOMTR-MCNC: 156 MG/DL — HIGH (ref 70–99)
GLUCOSE BLDC GLUCOMTR-MCNC: 185 MG/DL — HIGH (ref 70–99)
GLUCOSE SERPL-MCNC: 191 MG/DL — HIGH (ref 70–99)
HCO3 BLDV-SCNC: 26 MMOL/L — SIGNIFICANT CHANGE UP (ref 22–29)
HCT VFR BLD CALC: 30.2 % — LOW (ref 39–50)
HGB BLD-MCNC: 9.9 G/DL — LOW (ref 13–17)
HOROWITZ INDEX BLDV+IHG-RTO: 40 — SIGNIFICANT CHANGE UP
MAGNESIUM SERPL-MCNC: 2.6 MG/DL — SIGNIFICANT CHANGE UP (ref 1.6–2.6)
MCHC RBC-ENTMCNC: 27.8 PG — SIGNIFICANT CHANGE UP (ref 27–34)
MCHC RBC-ENTMCNC: 32.8 GM/DL — SIGNIFICANT CHANGE UP (ref 32–36)
MCV RBC AUTO: 84.8 FL — SIGNIFICANT CHANGE UP (ref 80–100)
NRBC # BLD: 0 /100 WBCS — SIGNIFICANT CHANGE UP (ref 0–0)
PCO2 BLDV: 44 MMHG — SIGNIFICANT CHANGE UP (ref 42–55)
PH BLDV: 7.38 — SIGNIFICANT CHANGE UP (ref 7.32–7.43)
PHOSPHATE SERPL-MCNC: 3.2 MG/DL — SIGNIFICANT CHANGE UP (ref 2.5–4.5)
PLATELET # BLD AUTO: 148 K/UL — LOW (ref 150–400)
PO2 BLDV: 36 MMHG — SIGNIFICANT CHANGE UP (ref 25–45)
POTASSIUM SERPL-MCNC: 4.3 MMOL/L — SIGNIFICANT CHANGE UP (ref 3.5–5.3)
POTASSIUM SERPL-SCNC: 4.3 MMOL/L — SIGNIFICANT CHANGE UP (ref 3.5–5.3)
PROT SERPL-MCNC: 5.8 G/DL — LOW (ref 6–8.3)
RBC # BLD: 3.56 M/UL — LOW (ref 4.2–5.8)
RBC # FLD: 14.1 % — SIGNIFICANT CHANGE UP (ref 10.3–14.5)
SAO2 % BLDV: 57.1 % — LOW (ref 67–88)
SODIUM SERPL-SCNC: 135 MMOL/L — SIGNIFICANT CHANGE UP (ref 135–145)
WBC # BLD: 10.62 K/UL — HIGH (ref 3.8–10.5)
WBC # FLD AUTO: 10.62 K/UL — HIGH (ref 3.8–10.5)

## 2022-07-09 PROCEDURE — 71045 X-RAY EXAM CHEST 1 VIEW: CPT | Mod: 26

## 2022-07-09 PROCEDURE — 99292 CRITICAL CARE ADDL 30 MIN: CPT | Mod: 24

## 2022-07-09 PROCEDURE — 99291 CRITICAL CARE FIRST HOUR: CPT

## 2022-07-09 PROCEDURE — 93306 TTE W/DOPPLER COMPLETE: CPT | Mod: 26

## 2022-07-09 RX ORDER — HYDROMORPHONE HYDROCHLORIDE 2 MG/ML
0.5 INJECTION INTRAMUSCULAR; INTRAVENOUS; SUBCUTANEOUS ONCE
Refills: 0 | Status: DISCONTINUED | OUTPATIENT
Start: 2022-07-09 | End: 2022-07-09

## 2022-07-09 RX ORDER — LISINOPRIL 2.5 MG/1
10 TABLET ORAL DAILY
Refills: 0 | Status: DISCONTINUED | OUTPATIENT
Start: 2022-07-09 | End: 2022-07-12

## 2022-07-09 RX ORDER — INSULIN LISPRO 100/ML
VIAL (ML) SUBCUTANEOUS
Refills: 0 | Status: DISCONTINUED | OUTPATIENT
Start: 2022-07-09 | End: 2022-07-12

## 2022-07-09 RX ORDER — HYDRALAZINE HCL 50 MG
10 TABLET ORAL ONCE
Refills: 0 | Status: COMPLETED | OUTPATIENT
Start: 2022-07-09 | End: 2022-07-09

## 2022-07-09 RX ORDER — FUROSEMIDE 40 MG
20 TABLET ORAL DAILY
Refills: 0 | Status: COMPLETED | OUTPATIENT
Start: 2022-07-09 | End: 2022-07-11

## 2022-07-09 RX ORDER — AMIODARONE HYDROCHLORIDE 400 MG/1
200 TABLET ORAL DAILY
Refills: 0 | Status: DISCONTINUED | OUTPATIENT
Start: 2022-07-13 | End: 2022-07-12

## 2022-07-09 RX ORDER — FUROSEMIDE 40 MG
40 TABLET ORAL ONCE
Refills: 0 | Status: COMPLETED | OUTPATIENT
Start: 2022-07-09 | End: 2022-07-09

## 2022-07-09 RX ORDER — HYDRALAZINE HCL 50 MG
5 TABLET ORAL ONCE
Refills: 0 | Status: COMPLETED | OUTPATIENT
Start: 2022-07-09 | End: 2022-07-09

## 2022-07-09 RX ORDER — PANTOPRAZOLE SODIUM 20 MG/1
40 TABLET, DELAYED RELEASE ORAL
Refills: 0 | Status: DISCONTINUED | OUTPATIENT
Start: 2022-07-09 | End: 2022-07-12

## 2022-07-09 RX ORDER — AMIODARONE HYDROCHLORIDE 400 MG/1
150 TABLET ORAL ONCE
Refills: 0 | Status: COMPLETED | OUTPATIENT
Start: 2022-07-09 | End: 2022-07-08

## 2022-07-09 RX ORDER — ATORVASTATIN CALCIUM 80 MG/1
80 TABLET, FILM COATED ORAL AT BEDTIME
Refills: 0 | Status: DISCONTINUED | OUTPATIENT
Start: 2022-07-09 | End: 2022-07-12

## 2022-07-09 RX ORDER — AMIODARONE HYDROCHLORIDE 400 MG/1
TABLET ORAL
Refills: 0 | Status: DISCONTINUED | OUTPATIENT
Start: 2022-07-09 | End: 2022-07-12

## 2022-07-09 RX ORDER — AMIODARONE HYDROCHLORIDE 400 MG/1
400 TABLET ORAL EVERY 8 HOURS
Refills: 0 | Status: DISCONTINUED | OUTPATIENT
Start: 2022-07-09 | End: 2022-07-12

## 2022-07-09 RX ORDER — ENOXAPARIN SODIUM 100 MG/ML
40 INJECTION SUBCUTANEOUS EVERY 24 HOURS
Refills: 0 | Status: DISCONTINUED | OUTPATIENT
Start: 2022-07-09 | End: 2022-07-12

## 2022-07-09 RX ORDER — SPIRONOLACTONE 25 MG/1
25 TABLET, FILM COATED ORAL DAILY
Refills: 0 | Status: DISCONTINUED | OUTPATIENT
Start: 2022-07-09 | End: 2022-07-11

## 2022-07-09 RX ADMIN — Medication 650 MILLIGRAM(S): at 05:27

## 2022-07-09 RX ADMIN — LISINOPRIL 10 MILLIGRAM(S): 2.5 TABLET ORAL at 13:44

## 2022-07-09 RX ADMIN — Medication 100 MILLIGRAM(S): at 01:51

## 2022-07-09 RX ADMIN — HYDROMORPHONE HYDROCHLORIDE 0.5 MILLIGRAM(S): 2 INJECTION INTRAMUSCULAR; INTRAVENOUS; SUBCUTANEOUS at 12:50

## 2022-07-09 RX ADMIN — Medication 5: at 00:00

## 2022-07-09 RX ADMIN — Medication 650 MILLIGRAM(S): at 17:11

## 2022-07-09 RX ADMIN — PANTOPRAZOLE SODIUM 40 MILLIGRAM(S): 20 TABLET, DELAYED RELEASE ORAL at 09:21

## 2022-07-09 RX ADMIN — AMIODARONE HYDROCHLORIDE 400 MILLIGRAM(S): 400 TABLET ORAL at 09:20

## 2022-07-09 RX ADMIN — ENOXAPARIN SODIUM 40 MILLIGRAM(S): 100 INJECTION SUBCUTANEOUS at 05:27

## 2022-07-09 RX ADMIN — POLYETHYLENE GLYCOL 3350 17 GRAM(S): 17 POWDER, FOR SOLUTION ORAL at 12:26

## 2022-07-09 RX ADMIN — SPIRONOLACTONE 25 MILLIGRAM(S): 25 TABLET, FILM COATED ORAL at 05:27

## 2022-07-09 RX ADMIN — Medication 325 MILLIGRAM(S): at 12:27

## 2022-07-09 RX ADMIN — Medication 10 MILLIGRAM(S): at 03:50

## 2022-07-09 RX ADMIN — GABAPENTIN 100 MILLIGRAM(S): 400 CAPSULE ORAL at 05:28

## 2022-07-09 RX ADMIN — AMIODARONE HYDROCHLORIDE 16.7 MG/MIN: 400 TABLET ORAL at 05:28

## 2022-07-09 RX ADMIN — HYDROMORPHONE HYDROCHLORIDE 0.5 MILLIGRAM(S): 2 INJECTION INTRAMUSCULAR; INTRAVENOUS; SUBCUTANEOUS at 09:20

## 2022-07-09 RX ADMIN — ATORVASTATIN CALCIUM 80 MILLIGRAM(S): 80 TABLET, FILM COATED ORAL at 21:54

## 2022-07-09 RX ADMIN — Medication 5 MILLIGRAM(S): at 13:16

## 2022-07-09 RX ADMIN — MILRINONE LACTATE 4.33 MICROGRAM(S)/KG/MIN: 1 INJECTION, SOLUTION INTRAVENOUS at 05:28

## 2022-07-09 RX ADMIN — Medication 650 MILLIGRAM(S): at 01:52

## 2022-07-09 RX ADMIN — HYDROMORPHONE HYDROCHLORIDE 0.5 MILLIGRAM(S): 2 INJECTION INTRAMUSCULAR; INTRAVENOUS; SUBCUTANEOUS at 07:05

## 2022-07-09 RX ADMIN — Medication 650 MILLIGRAM(S): at 06:47

## 2022-07-09 RX ADMIN — Medication 500 MILLIGRAM(S): at 05:27

## 2022-07-09 RX ADMIN — Medication 650 MILLIGRAM(S): at 02:20

## 2022-07-09 RX ADMIN — HYDROMORPHONE HYDROCHLORIDE 0.5 MILLIGRAM(S): 2 INJECTION INTRAMUSCULAR; INTRAVENOUS; SUBCUTANEOUS at 13:05

## 2022-07-09 RX ADMIN — GABAPENTIN 100 MILLIGRAM(S): 400 CAPSULE ORAL at 13:16

## 2022-07-09 RX ADMIN — HYDROMORPHONE HYDROCHLORIDE 0.5 MILLIGRAM(S): 2 INJECTION INTRAMUSCULAR; INTRAVENOUS; SUBCUTANEOUS at 06:50

## 2022-07-09 RX ADMIN — Medication 2: at 07:33

## 2022-07-09 RX ADMIN — HYDROMORPHONE HYDROCHLORIDE 0.5 MILLIGRAM(S): 2 INJECTION INTRAMUSCULAR; INTRAVENOUS; SUBCUTANEOUS at 09:35

## 2022-07-09 RX ADMIN — AMIODARONE HYDROCHLORIDE 400 MILLIGRAM(S): 400 TABLET ORAL at 17:10

## 2022-07-09 RX ADMIN — Medication 650 MILLIGRAM(S): at 12:27

## 2022-07-09 RX ADMIN — GABAPENTIN 100 MILLIGRAM(S): 400 CAPSULE ORAL at 21:54

## 2022-07-09 RX ADMIN — Medication 500 MILLIGRAM(S): at 17:11

## 2022-07-09 RX ADMIN — Medication 40 MILLIGRAM(S): at 12:26

## 2022-07-09 RX ADMIN — Medication 20 MILLIGRAM(S): at 03:14

## 2022-07-09 RX ADMIN — Medication 2: at 12:35

## 2022-07-09 RX ADMIN — Medication 5: at 16:44

## 2022-07-09 NOTE — PROGRESS NOTE ADULT - SUBJECTIVE AND OBJECTIVE BOX
VITAL SIGNS    Telemetry: SR 80s   Overnight Events: no acute events    Vital Signs Last 24 Hrs  T(C): 36.6 (22 @ 22:30), Max: 37.8 (22 @ 04:00)  T(F): 97.8 (22 @ 22:30), Max: 100 (22 @ 04:00)  HR: 83 (22 @ 22:30) (81 - 126)  BP: 134/70 (22 @ 22:30) (117/56 - 158/74)  RR: 20 (22 @ 22:30) (4 - 28)  SpO2: 96% (22 @ 22:30) (91% - 100%)             @ 07:01  -   @ 07:00  --------------------------------------------------------  IN: 1648 mL / OUT: 2245 mL / NET: -597 mL     @ 07:01  -   @ 23:21  --------------------------------------------------------  IN: 756.8 mL / OUT: 990 mL / NET: -233.2 mL       Daily     Daily Weight in k.1 (2022 03:00)  Admit Wt: Drug Dosing Weight  Height (cm): 165.1 (2022 07:15)  Weight (kg): 72.1 (2022 07:15)  BMI (kg/m2): 26.5 (2022 07:15)  BSA (m2): 1.79 (2022 07:15)    Bilirubin Total, Serum: 0.6 mg/dL ( @ 01:14)    CAPILLARY BLOOD GLUCOSE      POCT Blood Glucose.: 110 mg/dL (2022 21:53)  POCT Blood Glucose.: 156 mg/dL (2022 16:42)  POCT Blood Glucose.: 149 mg/dL (2022 12:23)  POCT Blood Glucose.: 146 mg/dL (2022 07:32)  POCT Blood Glucose.: 153 mg/dL (2022 03:52)  POCT Blood Glucose.: 185 mg/dL (2022 03:03)          acetaminophen     Tablet .. 650 milliGRAM(s) Oral every 6 hours  aMIOdarone    Tablet   Oral   aMIOdarone    Tablet 400 milliGRAM(s) Oral every 8 hours  ascorbic acid 500 milliGRAM(s) Oral two times a day  aspirin enteric coated 325 milliGRAM(s) Oral daily  atorvastatin 80 milliGRAM(s) Oral at bedtime  bisacodyl Suppository 10 milliGRAM(s) Rectal once  dextrose 50% Injectable 50 milliLiter(s) IV Push every 15 minutes  dextrose 50% Injectable 25 milliLiter(s) IV Push every 15 minutes  dextrose Oral Gel 15 Gram(s) Oral once PRN  enoxaparin Injectable 40 milliGRAM(s) SubCutaneous every 24 hours  furosemide   Injectable 20 milliGRAM(s) IV Push daily  gabapentin 100 milliGRAM(s) Oral every 8 hours  glucagon  Injectable 1 milliGRAM(s) IntraMuscular once  insulin lispro (ADMELOG) corrective regimen sliding scale   SubCutaneous Before meals and at bedtime  lisinopril 10 milliGRAM(s) Oral daily  milrinone Infusion 0.1 MICROgram(s)/kG/Min IV Continuous <Continuous>  oxyCODONE    IR 5 milliGRAM(s) Oral every 4 hours PRN  oxyCODONE    IR 10 milliGRAM(s) Oral every 4 hours PRN  pantoprazole    Tablet 40 milliGRAM(s) Oral before breakfast  polyethylene glycol 3350 17 Gram(s) Oral daily  senna 2 Tablet(s) Oral at bedtime  sodium chloride 0.9%. 1000 milliLiter(s) IV Continuous <Continuous>  spironolactone 25 milliGRAM(s) Oral daily                            9.9    10.62 )-----------( 148      ( 2022 01:14 )             30.2     07-09    135  |  103  |  17  ----------------------------<  191<H>  4.3   |  22  |  0.83    Ca    8.3<L>      2022 01:14  Phos  3.2     07-  Mg     2.6     07-09    TPro  5.8<L>  /  Alb  3.5  /  TBili  0.6  /  DBili  x   /  AST  57<H>  /  ALT  39  /  AlkPhos  52  07-09    PT/INR - ( 2022 00:16 )   PT: 14.5 sec;   INR: 1.25 ratio         PTT - ( 2022 00:16 )  PTT:33.0 sec    PHYSICAL EXAM    Subjective: "Hi."   General: well appearing male, in no acute distress, laying in bed, Left pleural CT to LWS, 2L NC. POD #2  Neurology: alert and oriented x 3, nonfocal, no gross deficits  CV : S1/S2, no murmurs/rubs/gallops  Sternal Wound : CDI with dressing, Stable, +left pleural CT to LWS  Lungs: clear. RR easy, unlabored   Abdomen: soft, nontender, nondistended, positive bowel sounds, -bowel movement x 2 days, Neg N/V/D, +flatus   :  pt voiding without difficulty   Extremities: BOND; no edema, neg calf tenderness. PPP bilaterally, groins stable, R SVG with dressing CDI    Drains:     MS         [  ] Drainage:                 L Pleural  [ x ]  Drainage: 360cc in 12hr serosanguineous    Coumadin    [ ] YES          [x]      NO         Eliquis    [ ] YES          [x]      NO       Heparin gtt   [  ] YES              [x]   NO  Dose:    Disposition:  Home [ x  ] with outpatient PT     Rehab [   ]       OR Date:    Plan of care discussed with Cardiothoracic Team at AM rounds.

## 2022-07-09 NOTE — PHYSICAL THERAPY INITIAL EVALUATION ADULT - ADDITIONAL COMMENTS
Pt resides in private home with spouse & daughter, 0 steps to enter, flight within. PTA independent with mobility and ADL's

## 2022-07-09 NOTE — PROGRESS NOTE ADULT - SUBJECTIVE AND OBJECTIVE BOX
Patient seen and examined at the bedside.    Remained critically ill on continuous ICU monitoring.    OBJECTIVE:  Vital Signs Last 24 Hrs  T(C): 37.8 (09 Jul 2022 04:00), Max: 37.8 (08 Jul 2022 20:00)  T(F): 100 (09 Jul 2022 04:00), Max: 100 (08 Jul 2022 20:00)  HR: 93 (09 Jul 2022 05:00) (87 - 146)  BP: 133/62 (09 Jul 2022 05:00) (112/66 - 144/65)  BP(mean): 89 (09 Jul 2022 05:00) (77 - 99)  RR: 21 (09 Jul 2022 05:00) (2 - 39)  SpO2: 97% (09 Jul 2022 05:00) (91% - 100%)    Parameters below as of 09 Jul 2022 04:00  Patient On (Oxygen Delivery Method): nasal cannula, high flow  O2 Flow (L/min): 40  O2 Concentration (%): 40    Assessment:  71 yr M DM2, HLD, HTN, CAD, and B/L Carotid stenosis.     CAD S/P C3L on 7/7   Severe TR, mod MR, BiV dysfunction post bypass, s/p IABP intraop (removed on 7/8)  Hemodynamic instability  Hypovolemia  Post op respiratory insufficiency  Acute blood loss anemia    Plan:   ***Neuro***  [x] Nonfocal     Post operative neuro assessment     ***Cardiovascular***  Intraop STEVE: Biventricular function improved with (LVEF estimated 50-60%) with IABP (removed on 7/8)  Invasive hemodynamic monitoring, assess perfusion indices   Afib / CVP 7 / Hct 30.2% / Lactate 1.6   [x] Primacor 0.2mcg   IABP removed on 7/8, continue to monitor groin site closely   Continuos reassessment of hemodynamics   Afib prophylaxis with Amiodarone   Monitor chest tube outputs    [x] VTE prophylaxis with Lovenox   [x]  ASA [x] Statin   Serial EKG and cardiac enzymes     ***Pulmonary***  [x] HFNC 40%/40L s/p SUMANTH  Encourage incentive spirometry, continue pulse ox monitoring, follow ABGs     ***GI***  [x] Diet: Tolerating consistent carb diet   [x] Protonix    Bowel regimen with Miralax  and Senna     ***Renal***  Continue to monitor I/Os, BUN/Creatinine.   Replete lytes PRN  Continue to diurese with Lasix and aldactone     ***ID***  No active antibiotic coverage.     ***Endocrine***   [x]  DM2 : HbA1c 5.7%                - [x]  ISS [x] insulin gtt             - Need tight glycemic control to prevent wound infection.          Patient requires continuous monitoring with bedside rhythm monitoring, pulse oximetry monitoring, and continuous central venous and arterial pressure monitoring; and intermittent blood gas analysis. Care plan discussed with the ICU care team.   Patient remained critical, at risk for life threatening decompensation.    I have spent 30 minutes providing critical care management to this patient.    By signing my name below, I, Alicia Moore, attest that this documentation has been prepared under the direction and in the presence of Iban Flores MD   Electronically signed: Christiano Serna, 07-09-22 @ 06:41    I, Iban Flores, personally performed the services described in this documentation. all medical record entries made by the scribe were at my direction and in my presence. I have reviewed the chart and agree that the record reflects my personal performance and is accurate and complete  Electronically signed: Iban Flores MD

## 2022-07-09 NOTE — OCCUPATIONAL THERAPY INITIAL EVALUATION ADULT - ADDITIONAL COMMENTS
pt/pt's family reports lives in home with 1st floor setup, 0 steps to enter. Ind with ADLs/functional mobility prior to admission with no AD/DME. drives. Pt family able to assist as needed.

## 2022-07-09 NOTE — PROVIDER CONTACT NOTE (CHANGE IN STATUS NOTIFICATION) - ACTION/TREATMENT ORDERED:
MD notified and made aware, as per MD EKG, magnesium, amiodarone bolus, and lidocaine to be given. no further interventions at this time. will continue to monitor.

## 2022-07-09 NOTE — PHYSICAL THERAPY INITIAL EVALUATION ADULT - PLANNED THERAPY INTERVENTIONS, PT EVAL
GOALS: Pt will negotiate 12 steps with handrail & step to pattern with independence in 4wks/bed mobility training/gait training/transfer training

## 2022-07-09 NOTE — PROGRESS NOTE ADULT - SUBJECTIVE AND OBJECTIVE BOX
Patient seen and examined at the bedside.    Remained critically ill on continuous ICU monitoring.    OBJECTIVE:  Vital Signs Last 24 Hrs  T(C): 36.4 (09 Jul 2022 20:00), Max: 37.8 (09 Jul 2022 04:00)  T(F): 97.5 (09 Jul 2022 20:00), Max: 100 (09 Jul 2022 04:00)  HR: 84 (09 Jul 2022 21:19) (83 - 136)  BP: 138/64 (09 Jul 2022 19:00) (117/56 - 158/74)  BP(mean): 92 (09 Jul 2022 19:00) (81 - 106)  RR: 23 (09 Jul 2022 21:19) (4 - 28)  SpO2: 96% (09 Jul 2022 21:19) (91% - 100%)    Parameters below as of 09 Jul 2022 21:19  Patient On (Oxygen Delivery Method): nasal cannula    O2 Concentration (%): 2      Physical Exam:   General: NAD   Neurology: nonfocal   Eyes: bilateral pupils equal and reactive   ENT/Neck: Neck supple, trachea midline, No JVD   Respiratory: Clear bilaterally   CV: S1S2, no murmurs        [x] Sternal dressing, [x] Mediastinal CT, [x] Pleural CT,         [x] Sinus rhythm,  Abdominal: Soft, NT, ND +BS   Extremities: 1-2+ pedal edema noted, + peripheral pulses   Skin: No Rashes, Hematoma, Ecchymosis                           Assessment:  71 yr M DM2, HLD, HTN, CAD, and B/L Carotid stenosis.     CAD S/P C3L on 7/7   Severe TR, mod MR, BiV dysfunction post bypass, s/p IABP intraop (removed on 7/8)  Hemodynamic instability  Hypovolemia  Post op respiratory insufficiency  Acute blood loss anemia        Plan:   ***Neuro***  [x] Nonfocal     Post operative neuro assessment       ***Cardiovascular***  Intraop STEVE: Biventricular function improved with (LVEF at 70%) with IABP (removed on 7/8)  Invasive hemodynamic monitoring, assess perfusion indices   SR / CVP 9/ MAP 78/ Hct 30.2/ Lactate 1.6  [x] Primacor 0.2mcg   IABP removed on 7/8, continue to monitor groin site closely   Continuos reassessment of hemodynamics   Afib prophylaxis with Amiodarone   Monitor chest tube outputs    [x] VTE prophylaxis with Lovenox   [x]  ASA [x] Statin   C/w Zestril  Serial EKG and cardiac enzymes        ***Pulmonary***  [x] NC- 2L  Encourage incentive spirometry, continue pulse ox monitoring, follow ABGs                   ***GI***  [x] Diet: Tolerating consistent carb diet   [x] Protonix    Bowel regimen with Miralax  and Senna       ***Renal***  Continue to monitor I/Os, BUN/Creatinine.   Replete lytes PRN  Continue to diurese with Lasix and aldactone   Song Present    ***ID***  No active antibiotic coverage      ***Endocrine***  [x]  DM2 : HbA1c 5.7%                - [x]  ISS [x] insulin gtt             - Need tight glycemic control to prevent wound infection.          Patient requires continuous monitoring with bedside rhythm monitoring, pulse oximetry monitoring, and continuous central venous and arterial pressure monitoring; and intermittent blood gas analysis. Care plan discussed with the ICU care team.   Patient remained critical, at risk for life threatening decompensation.    I have spent 30 minutes providing critical care management to this patient.    By signing my name below, I, Bulmaro Rosenberg, attest that this documentation has been prepared under the direction and in the presence of NILA Velázquez   Electronically signed: Christiano Newman, 07-09-22 @ 21:50    I, NILA Velázquez, personally performed the services described in this documentation. all medical record entries made by the fordibmiroslava were at my direction and in my presence. I have reviewed the chart and agree that the record reflects my personal performance and is accurate and complete  Electronically signed: NILA Velázquez

## 2022-07-09 NOTE — PHYSICAL THERAPY INITIAL EVALUATION ADULT - GAIT DEVIATIONS NOTED, PT EVAL
decreased river/increased time in double stance/decreased velocity of limb motion/decreased step length/decreased stride length/decreased weight-shifting ability

## 2022-07-09 NOTE — OCCUPATIONAL THERAPY INITIAL EVALUATION ADULT - PERTINENT HX OF CURRENT PROBLEM, REHAB EVAL
This is a 70 yo male PMH DM2 A1C 5.7, former smoker 30 pack yrs, bilat carotid stenosis,  HTN, HLD who presented to cardiology, Dr. BAUDILIO Briceno, for evaluation of left shoulder pain. NST 1/8/2021 EF 61% septal and apical moderate perfusion defect, moderate inferior defect.  Pt. presents asymptomatic for further evaluation and cardiac cath. no s/p CABGx3 7/7.

## 2022-07-09 NOTE — PHYSICAL THERAPY INITIAL EVALUATION ADULT - PERTINENT HX OF CURRENT PROBLEM, REHAB EVAL
Pt is 71M admitted 7/6/22 PMHx DM2 A1C 5.7, former smoker 30 pack yrs, bilat carotid stenosis,  HTN, HLD who presented to cardiology, Dr. BAUDILIO Briceno, for evaluation of left shoulder pain. NST 1/8/2021 EF 61% septal and apical moderate perfusion defect, moderate inferior defect.  Pt. presents asymptomatic for further evaluation and cardiac cath.

## 2022-07-10 LAB
ALBUMIN SERPL ELPH-MCNC: 3.2 G/DL — LOW (ref 3.3–5)
ALP SERPL-CCNC: 54 U/L — SIGNIFICANT CHANGE UP (ref 40–120)
ALT FLD-CCNC: 40 U/L — SIGNIFICANT CHANGE UP (ref 10–45)
ANION GAP SERPL CALC-SCNC: 12 MMOL/L — SIGNIFICANT CHANGE UP (ref 5–17)
AST SERPL-CCNC: 46 U/L — HIGH (ref 10–40)
BASOPHILS # BLD AUTO: 0.02 K/UL — SIGNIFICANT CHANGE UP (ref 0–0.2)
BASOPHILS NFR BLD AUTO: 0.2 % — SIGNIFICANT CHANGE UP (ref 0–2)
BILIRUB SERPL-MCNC: 0.6 MG/DL — SIGNIFICANT CHANGE UP (ref 0.2–1.2)
BUN SERPL-MCNC: 20 MG/DL — SIGNIFICANT CHANGE UP (ref 7–23)
CALCIUM SERPL-MCNC: 8.3 MG/DL — LOW (ref 8.4–10.5)
CHLORIDE SERPL-SCNC: 100 MMOL/L — SIGNIFICANT CHANGE UP (ref 96–108)
CO2 SERPL-SCNC: 24 MMOL/L — SIGNIFICANT CHANGE UP (ref 22–31)
CREAT SERPL-MCNC: 0.81 MG/DL — SIGNIFICANT CHANGE UP (ref 0.5–1.3)
EGFR: 95 ML/MIN/1.73M2 — SIGNIFICANT CHANGE UP
EOSINOPHIL # BLD AUTO: 0.12 K/UL — SIGNIFICANT CHANGE UP (ref 0–0.5)
EOSINOPHIL NFR BLD AUTO: 1.3 % — SIGNIFICANT CHANGE UP (ref 0–6)
GLUCOSE BLDC GLUCOMTR-MCNC: 103 MG/DL — HIGH (ref 70–99)
GLUCOSE BLDC GLUCOMTR-MCNC: 103 MG/DL — HIGH (ref 70–99)
GLUCOSE BLDC GLUCOMTR-MCNC: 105 MG/DL — HIGH (ref 70–99)
GLUCOSE BLDC GLUCOMTR-MCNC: 115 MG/DL — HIGH (ref 70–99)
GLUCOSE SERPL-MCNC: 112 MG/DL — HIGH (ref 70–99)
HCT VFR BLD CALC: 27.6 % — LOW (ref 39–50)
HGB BLD-MCNC: 9.1 G/DL — LOW (ref 13–17)
IMM GRANULOCYTES NFR BLD AUTO: 0.9 % — SIGNIFICANT CHANGE UP (ref 0–1.5)
LACTATE SERPL-SCNC: 1.4 MMOL/L — SIGNIFICANT CHANGE UP (ref 0.7–2)
LYMPHOCYTES # BLD AUTO: 1.3 K/UL — SIGNIFICANT CHANGE UP (ref 1–3.3)
LYMPHOCYTES # BLD AUTO: 14.1 % — SIGNIFICANT CHANGE UP (ref 13–44)
MAGNESIUM SERPL-MCNC: 2 MG/DL — SIGNIFICANT CHANGE UP (ref 1.6–2.6)
MCHC RBC-ENTMCNC: 27.8 PG — SIGNIFICANT CHANGE UP (ref 27–34)
MCHC RBC-ENTMCNC: 33 GM/DL — SIGNIFICANT CHANGE UP (ref 32–36)
MCV RBC AUTO: 84.4 FL — SIGNIFICANT CHANGE UP (ref 80–100)
MONOCYTES # BLD AUTO: 1 K/UL — HIGH (ref 0–0.9)
MONOCYTES NFR BLD AUTO: 10.9 % — SIGNIFICANT CHANGE UP (ref 2–14)
NEUTROPHILS # BLD AUTO: 6.67 K/UL — SIGNIFICANT CHANGE UP (ref 1.8–7.4)
NEUTROPHILS NFR BLD AUTO: 72.6 % — SIGNIFICANT CHANGE UP (ref 43–77)
NRBC # BLD: 0 /100 WBCS — SIGNIFICANT CHANGE UP (ref 0–0)
PHOSPHATE SERPL-MCNC: 2.6 MG/DL — SIGNIFICANT CHANGE UP (ref 2.5–4.5)
PLATELET # BLD AUTO: 146 K/UL — LOW (ref 150–400)
POTASSIUM SERPL-MCNC: 4.1 MMOL/L — SIGNIFICANT CHANGE UP (ref 3.5–5.3)
POTASSIUM SERPL-SCNC: 4.1 MMOL/L — SIGNIFICANT CHANGE UP (ref 3.5–5.3)
PROT SERPL-MCNC: 5.8 G/DL — LOW (ref 6–8.3)
RBC # BLD: 3.27 M/UL — LOW (ref 4.2–5.8)
RBC # FLD: 13.6 % — SIGNIFICANT CHANGE UP (ref 10.3–14.5)
SODIUM SERPL-SCNC: 136 MMOL/L — SIGNIFICANT CHANGE UP (ref 135–145)
WBC # BLD: 9.19 K/UL — SIGNIFICANT CHANGE UP (ref 3.8–10.5)
WBC # FLD AUTO: 9.19 K/UL — SIGNIFICANT CHANGE UP (ref 3.8–10.5)

## 2022-07-10 PROCEDURE — 71045 X-RAY EXAM CHEST 1 VIEW: CPT | Mod: 26

## 2022-07-10 RX ORDER — SORBITOL SOLUTION 70 %
30 SOLUTION, ORAL MISCELLANEOUS ONCE
Refills: 0 | Status: COMPLETED | OUTPATIENT
Start: 2022-07-10 | End: 2022-07-10

## 2022-07-10 RX ADMIN — Medication 650 MILLIGRAM(S): at 00:30

## 2022-07-10 RX ADMIN — AMIODARONE HYDROCHLORIDE 400 MILLIGRAM(S): 400 TABLET ORAL at 00:25

## 2022-07-10 RX ADMIN — AMIODARONE HYDROCHLORIDE 400 MILLIGRAM(S): 400 TABLET ORAL at 05:19

## 2022-07-10 RX ADMIN — AMIODARONE HYDROCHLORIDE 400 MILLIGRAM(S): 400 TABLET ORAL at 21:01

## 2022-07-10 RX ADMIN — PANTOPRAZOLE SODIUM 40 MILLIGRAM(S): 20 TABLET, DELAYED RELEASE ORAL at 05:20

## 2022-07-10 RX ADMIN — Medication 650 MILLIGRAM(S): at 05:19

## 2022-07-10 RX ADMIN — ATORVASTATIN CALCIUM 80 MILLIGRAM(S): 80 TABLET, FILM COATED ORAL at 21:01

## 2022-07-10 RX ADMIN — Medication 20 MILLIGRAM(S): at 05:19

## 2022-07-10 RX ADMIN — Medication 500 MILLIGRAM(S): at 17:05

## 2022-07-10 RX ADMIN — GABAPENTIN 100 MILLIGRAM(S): 400 CAPSULE ORAL at 21:01

## 2022-07-10 RX ADMIN — GABAPENTIN 100 MILLIGRAM(S): 400 CAPSULE ORAL at 05:19

## 2022-07-10 RX ADMIN — AMIODARONE HYDROCHLORIDE 400 MILLIGRAM(S): 400 TABLET ORAL at 14:58

## 2022-07-10 RX ADMIN — OXYCODONE HYDROCHLORIDE 5 MILLIGRAM(S): 5 TABLET ORAL at 21:30

## 2022-07-10 RX ADMIN — LISINOPRIL 10 MILLIGRAM(S): 2.5 TABLET ORAL at 05:19

## 2022-07-10 RX ADMIN — Medication 650 MILLIGRAM(S): at 13:14

## 2022-07-10 RX ADMIN — Medication 500 MILLIGRAM(S): at 05:20

## 2022-07-10 RX ADMIN — SENNA PLUS 2 TABLET(S): 8.6 TABLET ORAL at 00:25

## 2022-07-10 RX ADMIN — Medication 650 MILLIGRAM(S): at 05:26

## 2022-07-10 RX ADMIN — Medication 30 MILLILITER(S): at 12:44

## 2022-07-10 RX ADMIN — Medication 650 MILLIGRAM(S): at 12:44

## 2022-07-10 RX ADMIN — Medication 325 MILLIGRAM(S): at 12:43

## 2022-07-10 RX ADMIN — GABAPENTIN 100 MILLIGRAM(S): 400 CAPSULE ORAL at 14:58

## 2022-07-10 RX ADMIN — SPIRONOLACTONE 25 MILLIGRAM(S): 25 TABLET, FILM COATED ORAL at 05:19

## 2022-07-10 RX ADMIN — ENOXAPARIN SODIUM 40 MILLIGRAM(S): 100 INJECTION SUBCUTANEOUS at 05:20

## 2022-07-10 RX ADMIN — Medication 650 MILLIGRAM(S): at 00:25

## 2022-07-10 RX ADMIN — OXYCODONE HYDROCHLORIDE 5 MILLIGRAM(S): 5 TABLET ORAL at 21:00

## 2022-07-10 NOTE — PROGRESS NOTE ADULT - SUBJECTIVE AND OBJECTIVE BOX
Subjectiv e " I am feeling better today"    VITAL SIGNS    Telemetry:  NSR     Vital Signs Last 24 Hrs  T(C): 36.9 (07-10-22 @ 07:51), Max: 37.1 (22 @ 12:00)  T(F): 98.4 (07-10-22 @ 07:51), Max: 98.8 (22 @ 12:00)  HR: 83 (07-10-22 @ 07:51) (73 - 96)  BP: 120/58 (07-10-22 @ 07:51) (117/56 - 158/74)  RR: 18 (07-10-22 @ 07:51) (5 - 27)  SpO2: 95% (07-10-22 @ 07:51) (91% - 100%)            @ 07:01  -  07-10 @ 07:00  --------------------------------------------------------  IN: 924.4 mL / OUT: 1650 mL / NET: -725.6 mL    Daily Weight in k.2 (10 Jul 2022 05:25)                            9.1    9.19  )-----------( 146      ( 10 Jul 2022 05:56 )             27.6     07-10    136  |  100  |  20  ----------------------------<  112<H>  4.1   |  24  |  0.81    Ca    8.3<L>      10 Jul 2022 05:53  Phos  2.6     07-10  Mg     2.0     10    TPro  5.8<L>  /  Alb  3.2<L>  /  TBili  0.6  /  DBili  x   /  AST  46<H>  /  ALT  40  /  AlkPhos  54  07-10    MEDICATIONS  (STANDING):  acetaminophen     Tablet .. 650 milliGRAM(s) Oral every 6 hours  aMIOdarone    Tablet   Oral   aMIOdarone    Tablet 400 milliGRAM(s) Oral every 8 hours  ascorbic acid 500 milliGRAM(s) Oral two times a day  aspirin enteric coated 325 milliGRAM(s) Oral daily  atorvastatin 80 milliGRAM(s) Oral at bedtime  bisacodyl Suppository 10 milliGRAM(s) Rectal once  enoxaparin Injectable 40 milliGRAM(s) SubCutaneous every 24 hours  furosemide   Injectable 20 milliGRAM(s) IV Push daily  gabapentin 100 milliGRAM(s) Oral every 8 hours  glucagon  Injectable 1 milliGRAM(s) IntraMuscular once  insulin lispro (ADMELOG) corrective regimen sliding scale   SubCutaneous Before meals and at bedtime  lisinopril 10 milliGRAM(s) Oral daily  milrinone Infusion 0.1 MICROgram(s)/kG/Min (2.16 mL/Hr) IV Continuous <Continuous>  pantoprazole    Tablet 40 milliGRAM(s) Oral before breakfast  polyethylene glycol 3350 17 Gram(s) Oral daily  senna 2 Tablet(s) Oral at bedtime  sodium chloride 0.9%. 1000 milliLiter(s) (10 mL/Hr) IV Continuous <Continuous>  spironolactone 25 milliGRAM(s) Oral daily    MEDICATIONS  (PRN):  acetaminophen     Tablet .. 650 milliGRAM(s) Oral every 6 hours PRN Mild Pain (1 - 3)  dextrose Oral Gel 15 Gram(s) Oral once PRN Blood Glucose LESS THAN 70 milliGRAM(s)/deciliter  oxyCODONE    IR 10 milliGRAM(s) Oral every 4 hours PRN Severe Pain (7 - 10)  oxyCODONE    IR 5 milliGRAM(s) Oral every 4 hours PRN Moderate Pain (4 - 6)      CAPILLARY BLOOD GLUCOSE      POCT Blood Glucose.: 103 mg/dL (10 Jul 2022 07:29)  POCT Blood Glucose.: 110 mg/dL (2022 21:53)  POCT Blood Glucose.: 156 mg/dL (2022 16:42)  POCT Blood Glucose.: 149 mg/dL (2022 12:23)          Drains:     MS         [  ] Drainage:                 L Pleural  [x  ]  Drainage:     60/120           R Pleural  [  ]  Drainage:                                PHYSICAL EXAM        Neurology: alert and oriented x 3, nonfocal, no gross deficits    CV :[id number]    Sternal Wound :  CDI , Stable    Lungs: B/l easy breath sounds roomair    Abdomen: soft, nontender, nondistended, positive bowel sounds, last bowel movement preop + Flatus    :   Voids            Extremities:   Equal strength throughout    B/lle warm well perfused + DP                                      Physical Therapy Rec:   Home  [x  ]   Home w/ PT  [  ]  Rehab  [  ]    Discussed with Cardiothoracic Team at AM rounds. Subjectiv e " I am feeling better today"    VITAL SIGNS    Telemetry:  NSR     Vital Signs Last 24 Hrs  T(C): 36.9 (07-10-22 @ 07:51), Max: 37.1 (22 @ 12:00)  T(F): 98.4 (07-10-22 @ 07:51), Max: 98.8 (22 @ 12:00)  HR: 83 (07-10-22 @ 07:51) (73 - 96)  BP: 120/58 (07-10-22 @ 07:51) (117/56 - 158/74)  RR: 18 (07-10-22 @ 07:51) (5 - 27)  SpO2: 95% (07-10-22 @ 07:51) (91% - 100%)            @ 07:01  -  07-10 @ 07:00  --------------------------------------------------------  IN: 924.4 mL / OUT: 1650 mL / NET: -725.6 mL    Daily Weight in k.2 (10 Jul 2022 05:25)                            9.1    9.19  )-----------( 146      ( 10 Jul 2022 05:56 )             27.6     07-10    136  |  100  |  20  ----------------------------<  112<H>  4.1   |  24  |  0.81    Ca    8.3<L>      10 Jul 2022 05:53  Phos  2.6     07-10  Mg     2.0     10    TPro  5.8<L>  /  Alb  3.2<L>  /  TBili  0.6  /  DBili  x   /  AST  46<H>  /  ALT  40  /  AlkPhos  54  07-10    MEDICATIONS  (STANDING):  acetaminophen     Tablet .. 650 milliGRAM(s) Oral every 6 hours  aMIOdarone    Tablet   Oral   aMIOdarone    Tablet 400 milliGRAM(s) Oral every 8 hours  ascorbic acid 500 milliGRAM(s) Oral two times a day  aspirin enteric coated 325 milliGRAM(s) Oral daily  atorvastatin 80 milliGRAM(s) Oral at bedtime  bisacodyl Suppository 10 milliGRAM(s) Rectal once  enoxaparin Injectable 40 milliGRAM(s) SubCutaneous every 24 hours  furosemide   Injectable 20 milliGRAM(s) IV Push daily  gabapentin 100 milliGRAM(s) Oral every 8 hours  glucagon  Injectable 1 milliGRAM(s) IntraMuscular once  insulin lispro (ADMELOG) corrective regimen sliding scale   SubCutaneous Before meals and at bedtime  lisinopril 10 milliGRAM(s) Oral daily  milrinone Infusion 0.1 MICROgram(s)/kG/Min (2.16 mL/Hr) IV Continuous <Continuous>  pantoprazole    Tablet 40 milliGRAM(s) Oral before breakfast  polyethylene glycol 3350 17 Gram(s) Oral daily  senna 2 Tablet(s) Oral at bedtime  sodium chloride 0.9%. 1000 milliLiter(s) (10 mL/Hr) IV Continuous <Continuous>  spironolactone 25 milliGRAM(s) Oral daily    MEDICATIONS  (PRN):  acetaminophen     Tablet .. 650 milliGRAM(s) Oral every 6 hours PRN Mild Pain (1 - 3)  dextrose Oral Gel 15 Gram(s) Oral once PRN Blood Glucose LESS THAN 70 milliGRAM(s)/deciliter  oxyCODONE    IR 10 milliGRAM(s) Oral every 4 hours PRN Severe Pain (7 - 10)  oxyCODONE    IR 5 milliGRAM(s) Oral every 4 hours PRN Moderate Pain (4 - 6)      CAPILLARY BLOOD GLUCOSE      POCT Blood Glucose.: 103 mg/dL (10 Jul 2022 07:29)  POCT Blood Glucose.: 110 mg/dL (2022 21:53)  POCT Blood Glucose.: 156 mg/dL (2022 16:42)  POCT Blood Glucose.: 149 mg/dL (2022 12:23)          Drains:     MS         [  ] Drainage:                 L Pleural  [x  ]  Drainage:     60/120           R Pleural  [  ]  Drainage:                                PHYSICAL EXAM        Neurology: alert and oriented x 3, nonfocal, no gross deficits    CV :[id number]    Sternal Wound :  CDI , Stable    Lungs: B/l easy breath sounds roomair    Abdomen: soft, nontender, nondistended, positive bowel sounds, last bowel movement preop + Flatus    :   Voids            Extremities:   Equal strength throughout    B/lle warm well perfused + DP  left radial dai                                      Physical Therapy Rec:   Home  [x  ]   Home w/ PT  [  ]  Rehab  [  ]    Discussed with Cardiothoracic Team at AM rounds.

## 2022-07-11 LAB
ALBUMIN SERPL ELPH-MCNC: 3.6 G/DL — SIGNIFICANT CHANGE UP (ref 3.3–5)
ALP SERPL-CCNC: 67 U/L — SIGNIFICANT CHANGE UP (ref 40–120)
ALT FLD-CCNC: 48 U/L — HIGH (ref 10–45)
ANION GAP SERPL CALC-SCNC: 15 MMOL/L — SIGNIFICANT CHANGE UP (ref 5–17)
AST SERPL-CCNC: 40 U/L — SIGNIFICANT CHANGE UP (ref 10–40)
BILIRUB SERPL-MCNC: 0.7 MG/DL — SIGNIFICANT CHANGE UP (ref 0.2–1.2)
BUN SERPL-MCNC: 18 MG/DL — SIGNIFICANT CHANGE UP (ref 7–23)
CALCIUM SERPL-MCNC: 8.7 MG/DL — SIGNIFICANT CHANGE UP (ref 8.4–10.5)
CHLORIDE SERPL-SCNC: 101 MMOL/L — SIGNIFICANT CHANGE UP (ref 96–108)
CO2 SERPL-SCNC: 25 MMOL/L — SIGNIFICANT CHANGE UP (ref 22–31)
CREAT SERPL-MCNC: 0.89 MG/DL — SIGNIFICANT CHANGE UP (ref 0.5–1.3)
EGFR: 92 ML/MIN/1.73M2 — SIGNIFICANT CHANGE UP
GLUCOSE BLDC GLUCOMTR-MCNC: 110 MG/DL — HIGH (ref 70–99)
GLUCOSE BLDC GLUCOMTR-MCNC: 112 MG/DL — HIGH (ref 70–99)
GLUCOSE BLDC GLUCOMTR-MCNC: 116 MG/DL — HIGH (ref 70–99)
GLUCOSE BLDC GLUCOMTR-MCNC: 122 MG/DL — HIGH (ref 70–99)
GLUCOSE SERPL-MCNC: 113 MG/DL — HIGH (ref 70–99)
HCT VFR BLD CALC: 31.4 % — LOW (ref 39–50)
HGB BLD-MCNC: 10.4 G/DL — LOW (ref 13–17)
MAGNESIUM SERPL-MCNC: 2 MG/DL — SIGNIFICANT CHANGE UP (ref 1.6–2.6)
MCHC RBC-ENTMCNC: 28.3 PG — SIGNIFICANT CHANGE UP (ref 27–34)
MCHC RBC-ENTMCNC: 33.1 GM/DL — SIGNIFICANT CHANGE UP (ref 32–36)
MCV RBC AUTO: 85.6 FL — SIGNIFICANT CHANGE UP (ref 80–100)
NRBC # BLD: 0 /100 WBCS — SIGNIFICANT CHANGE UP (ref 0–0)
PHOSPHATE SERPL-MCNC: 2.7 MG/DL — SIGNIFICANT CHANGE UP (ref 2.5–4.5)
PLATELET # BLD AUTO: 235 K/UL — SIGNIFICANT CHANGE UP (ref 150–400)
POTASSIUM SERPL-MCNC: 4.1 MMOL/L — SIGNIFICANT CHANGE UP (ref 3.5–5.3)
POTASSIUM SERPL-SCNC: 4.1 MMOL/L — SIGNIFICANT CHANGE UP (ref 3.5–5.3)
PROT SERPL-MCNC: 6.6 G/DL — SIGNIFICANT CHANGE UP (ref 6–8.3)
RBC # BLD: 3.67 M/UL — LOW (ref 4.2–5.8)
RBC # FLD: 13.8 % — SIGNIFICANT CHANGE UP (ref 10.3–14.5)
SODIUM SERPL-SCNC: 141 MMOL/L — SIGNIFICANT CHANGE UP (ref 135–145)
WBC # BLD: 11.27 K/UL — HIGH (ref 3.8–10.5)
WBC # FLD AUTO: 11.27 K/UL — HIGH (ref 3.8–10.5)

## 2022-07-11 RX ORDER — METOPROLOL TARTRATE 50 MG
50 TABLET ORAL
Refills: 0 | Status: DISCONTINUED | OUTPATIENT
Start: 2022-07-11 | End: 2022-07-12

## 2022-07-11 RX ORDER — METOPROLOL TARTRATE 50 MG
25 TABLET ORAL EVERY 8 HOURS
Refills: 0 | Status: DISCONTINUED | OUTPATIENT
Start: 2022-07-11 | End: 2022-07-11

## 2022-07-11 RX ORDER — ACETAMINOPHEN 500 MG
650 TABLET ORAL EVERY 6 HOURS
Refills: 0 | Status: DISCONTINUED | OUTPATIENT
Start: 2022-07-11 | End: 2022-07-12

## 2022-07-11 RX ADMIN — Medication 50 MILLIGRAM(S): at 17:34

## 2022-07-11 RX ADMIN — GABAPENTIN 100 MILLIGRAM(S): 400 CAPSULE ORAL at 13:54

## 2022-07-11 RX ADMIN — ENOXAPARIN SODIUM 40 MILLIGRAM(S): 100 INJECTION SUBCUTANEOUS at 04:00

## 2022-07-11 RX ADMIN — PANTOPRAZOLE SODIUM 40 MILLIGRAM(S): 20 TABLET, DELAYED RELEASE ORAL at 04:05

## 2022-07-11 RX ADMIN — Medication 20 MILLIGRAM(S): at 03:59

## 2022-07-11 RX ADMIN — Medication 650 MILLIGRAM(S): at 08:41

## 2022-07-11 RX ADMIN — Medication 25 MILLIGRAM(S): at 13:56

## 2022-07-11 RX ADMIN — Medication 650 MILLIGRAM(S): at 18:05

## 2022-07-11 RX ADMIN — Medication 500 MILLIGRAM(S): at 17:30

## 2022-07-11 RX ADMIN — ATORVASTATIN CALCIUM 80 MILLIGRAM(S): 80 TABLET, FILM COATED ORAL at 21:03

## 2022-07-11 RX ADMIN — Medication 650 MILLIGRAM(S): at 09:15

## 2022-07-11 RX ADMIN — AMIODARONE HYDROCHLORIDE 400 MILLIGRAM(S): 400 TABLET ORAL at 13:54

## 2022-07-11 RX ADMIN — LISINOPRIL 10 MILLIGRAM(S): 2.5 TABLET ORAL at 03:59

## 2022-07-11 RX ADMIN — Medication 500 MILLIGRAM(S): at 04:00

## 2022-07-11 RX ADMIN — SPIRONOLACTONE 25 MILLIGRAM(S): 25 TABLET, FILM COATED ORAL at 03:59

## 2022-07-11 RX ADMIN — AMIODARONE HYDROCHLORIDE 400 MILLIGRAM(S): 400 TABLET ORAL at 03:59

## 2022-07-11 RX ADMIN — GABAPENTIN 100 MILLIGRAM(S): 400 CAPSULE ORAL at 04:00

## 2022-07-11 RX ADMIN — Medication 325 MILLIGRAM(S): at 13:54

## 2022-07-11 RX ADMIN — Medication 25 MILLIGRAM(S): at 06:18

## 2022-07-11 RX ADMIN — AMIODARONE HYDROCHLORIDE 400 MILLIGRAM(S): 400 TABLET ORAL at 21:03

## 2022-07-11 RX ADMIN — Medication 650 MILLIGRAM(S): at 17:35

## 2022-07-11 RX ADMIN — GABAPENTIN 100 MILLIGRAM(S): 400 CAPSULE ORAL at 21:02

## 2022-07-11 NOTE — PROGRESS NOTE ADULT - PROBLEM SELECTOR PROBLEM 3
HTN (hypertension)
Diabetes mellitus
HTN (hypertension)
HTN (hypertension)
Patient/Caregiver provided printed discharge information.

## 2022-07-11 NOTE — PROGRESS NOTE ADULT - ASSESSMENT
This is a 70 yo male PMH DM2 A1C 5.7, former smoker 30 pack yrs, bilat carotid stenosis,  HTN, HLD who presented to cardiology, Dr. BAUDILIO Briceno, for evaluation of left shoulder pain.  Denies chest pain/pressure, SOB/FONSECA, dizziness, diaphoresis, palpitations, nausea, vomiting, peripheral edema, recent weight gain, or syncope.  No implanted monitoring devices. NST 1/8/2021 EF 61% septal and apical moderate perfusion defect, moderate inferior defect.  Pt. presents asymptomatic for further evaluation and cardiac cath.  (06 Jul 2022 08:20)  Cardiac cath done showing Multivessel disease; CTS Dr Montgomery consulted for CABG eval.     7/6: Pt seen an examined at bedside, all labs and imaging reviewed by Dr. Montgomery; Plan for CABG tomorrow in AM 7/7, preop workup in progress  
This is a 70 yo male PMH DM2 A1C 5.7, former smoker 30 pack yrs, bilat carotid stenosis,  HTN, HLD who presented to cardiology, Dr. BAUDILIO Briceno, for evaluation of left shoulder pain.  Denies chest pain/pressure, SOB/FONSECA, dizziness, diaphoresis, palpitations, nausea, vomiting, peripheral edema, recent weight gain, or syncope.  No implanted monitoring devices. NST 1/8/2021 EF 61% septal and apical moderate perfusion defect, moderate inferior defect.  Pt. presents asymptomatic for further evaluation and cardiac cath.  (06 Jul 2022 08:20)  Cardiac cath done showing Multivessel disease; CTS Dr Montgomery consulted for CABG eval.     7/6: Pt seen an examined at bedside, all labs and imaging reviewed by Dr. Montgomery; Plan for CABG tomorrow in AM 7/7, preop workup in progress  7/7 s/p C3L with Dr. Montgomery (LIMA-LAD, SVG-PDA, SVG-Circ)  7/8 IABP removed  7/9: Episode of Afib to 160s/unsustained Vtach, lido/amio bolus given, amio gtt dced this AM and PO load started, transferred to SDU at 2300 maintain primacor gtt, +void after bustos cath removal
This is a 70 yo male PMH DM2 A1C 5.7, former smoker 30 pack yrs, bilat carotid stenosis,  HTN, HLD who presented to cardiology, Dr. BAUDILIO Briceno, for evaluation of left shoulder pain.  Denies chest pain/pressure, SOB/FONSECA, dizziness, diaphoresis, palpitations, nausea, vomiting, peripheral edema, recent weight gain, or syncope.  No implanted monitoring devices. NST 1/8/2021 EF 61% septal and apical moderate perfusion defect, moderate inferior defect.  Pt. presents asymptomatic for further evaluation and cardiac cath.  (06 Jul 2022 08:20)  Cardiac cath done showing Multivessel disease; CTS Dr Montgomery consulted for CABG eval.     7/6: Pt seen an examined at bedside, all labs and imaging reviewed by Dr. Montgomery; Plan for CABG tomorrow in AM 7/7, preop workup in progress  7/7 s/p C3L with Dr. Montgomery (LIMA-LAD, SVG-PDA, SVG-Circ)  7/8 IABP removed  7/9: Episode of Afib to 160s/unsustained Vtach, lido/amio bolus given, amio gtt dced this AM and PO load started, transferred to SDU at 2300 maintain primacor gtt, +void after bustos cath removal  7/10 VSS Primacor  d/c this am Lactate  to follow d/c left chest tube voiding dispotion to home with outpt cardiac rehab
This is a 72 yo male PMH DM2 A1C 5.7, former smoker 30 pack yrs, bilat carotid stenosis,  HTN, HLD who presented to cardiology, Dr. BAUDILIO Briceno, for evaluation of left shoulder pain.  Denies chest pain/pressure, SOB/FONSECA, dizziness, diaphoresis, palpitations, nausea, vomiting, peripheral edema, recent weight gain, or syncope.  No implanted monitoring devices. NST 1/8/2021 EF 61% septal and apical moderate perfusion defect, moderate inferior defect.  Pt. presents asymptomatic for further evaluation and cardiac cath.  (06 Jul 2022 08:20)  Cardiac cath done showing Multivessel disease; CTS Dr Montgomery consulted for CABG eval.     7/6: Pt seen an examined at bedside, all labs and imaging reviewed by Dr. Montgomery; Plan for CABG tomorrow in AM 7/7, preop workup in progress  7/7 s/p C3L with Dr. Montgomery (LIMA-LAD, SVG-PDA, SVG-Circ)  7/8 IABP removed  7/9: Episode of Afib to 160s/unsustained Vtach, lido/amio bolus given, amio gtt dced this AM and PO load started, transferred to SDU at 2300 maintain primacor gtt, +void after bustos cath removal  7/10 VSS Primacor  d/c this am Lactate  to follow d/c left chest tube voiding dispotion to home with outpt cardiac rehab  7/11  Primacor weaned off yesterday, lopressor started this am .  Increase as tolerated.  d/c diuretic  May transer to floor

## 2022-07-11 NOTE — PROGRESS NOTE ADULT - PROBLEM SELECTOR PLAN 3
A1c outpatient 5.7  ISS  monitor finger sticks.
DASH diet  c/w Lisinopril 10mg QD  Maintain LRAL for BP monitoring  Home meds: Norvasc 10mg and Lisinopril 40mg QD, add as needed

## 2022-07-11 NOTE — PROGRESS NOTE ADULT - PROBLEM SELECTOR PLAN 1
c/w ASA 81mg, simvastatin 20mg @bedtime, Start BB  Preop w/u in progress- preop labs ordered, Carotids, PFTs, TFTs, TTE  Lisinopril Held for OR tomorrow   OR Date: Tomorrow 7/7  NPO after midnight
s/p CABG x 3 with Dr. Montgomery on 7/7/22 (LIMA-LAD, SVG-PDA, SVG-Circ)  c/w , Atorvastatin 80, BB held for prolonged inotropic support  c/w Primacor gtt at .1mcg, dc in AM  c/w Lasix 20mg QD x 3 days and Aldactone 25 QD  Left pleural CT to [facility]  ERP Protocol- Vit C/Gabapentin  LVX 40 QD for VTE prop  Incentive spirometry x 10 q 1hr, pulmonary toileting, increase ambulation, bowel regimen
s/p CABG x 3 with Dr. Montgomery on 7/7/22 (LIMA-LAD, SVG-PDA, SVG-Circ)  c/w , Atorvastatin 80, BB held for prolonged inotropic support  c/w Primacor gtt at .1mcg,  7/10 pimacor  dc in AM  c/w Lasix 20mg QD x 3 days  stop7/12 and Aldactone 25 QD  ERP Protocol- Vit C/Gabapentin  LVX 40 QD for VTE prop  Incentive spirometry x 10 q 1hr, pulmonary toileting, increase ambulation, bowel regimen  dispotion to home with outpatient cardiac rehab
s/p CABG x 3 with Dr. Montgomery on 7/7/22 (LIMA-LAD, SVG-PDA, SVG-Circ)  c/w , Atorvastatin 80, BB held for prolonged inotropic support  c/w Primacor gtt at .1mcg,--- weaned off  7/10, beta blocker 7/11  7/10 pimacor  dc in AM  c/w Lasix 20mg QD x 3 days  stop7/12 and Aldactone 25 QD--d/c  ERP Protocol- Vit C/Gabapentin  LVX 40 QD for VTE prop  Incentive spirometry x 10 q 1hr, pulmonary toileting, increase ambulation, bowel regimen  dispotion to home with outpatient cardiac rehab

## 2022-07-11 NOTE — PROGRESS NOTE ADULT - SUBJECTIVE AND OBJECTIVE BOX
VITAL SIGNS    Telemetry:  nsr 60-90    Vital Signs Last 24 Hrs  T(C): 36.8 (22 @ 07:07), Max: 36.9 (07-10-22 @ 23:09)  T(F): 98.2 (22 @ 07:07), Max: 98.4 (07-10-22 @ 23:09)  HR: 79 (22 @ 07:07) (79 - 94)  BP: 126/69 (22 @ 07:07) (126/69 - 165/76)  RR: 18 (22 @ 07:07) (18 - 18)  SpO2: 96% (22 @ 07:07) (94% - 98%)                   07-10 @ 07:01  -   @ 07:00  --------------------------------------------------------  IN: 1007 mL / OUT: 700 mL / NET: 307 mL     @ 07:01  -   @ 09:09  --------------------------------------------------------  IN: 0 mL / OUT: 0 mL / NET: 0 mL          Daily     Daily Weight in k.8 (2022 05:34)            CAPILLARY BLOOD GLUCOSE      POCT Blood Glucose.: 110 mg/dL (2022 07:10)  POCT Blood Glucose.: 103 mg/dL (10 Jul 2022 21:03)  POCT Blood Glucose.: 105 mg/dL (10 Jul 2022 16:20)  POCT Blood Glucose.: 115 mg/dL (10 Jul 2022 11:25)            Drains:        Pacing Wires    no    Coumadin    [ ] YES          [  ]      NO                                   PHYSICAL EXAM        Neurology: alert and oriented x 3, nonfocal, no gross deficits  CV : s1 s2 RRR  Sternal Wound :  CDI , Stable  Lungs: cta  Abdomen: soft, nontender, nondistended, positive bowel sounds, last bowel movement ++++                        :    voiding         Extremities:     - edema   /  -   calve tenderness ,  R leg  incisions cdi          acetaminophen     Tablet .. 650 milliGRAM(s) Oral every 6 hours PRN  acetaminophen     Tablet .. 650 milliGRAM(s) Oral every 6 hours PRN  aMIOdarone    Tablet   Oral   aMIOdarone    Tablet 400 milliGRAM(s) Oral every 8 hours  ascorbic acid 500 milliGRAM(s) Oral two times a day  aspirin enteric coated 325 milliGRAM(s) Oral daily  atorvastatin 80 milliGRAM(s) Oral at bedtime  bisacodyl Suppository 10 milliGRAM(s) Rectal once  dextrose 50% Injectable 25 milliLiter(s) IV Push every 15 minutes  dextrose 50% Injectable 50 milliLiter(s) IV Push every 15 minutes  dextrose Oral Gel 15 Gram(s) Oral once PRN  enoxaparin Injectable 40 milliGRAM(s) SubCutaneous every 24 hours  gabapentin 100 milliGRAM(s) Oral every 8 hours  glucagon  Injectable 1 milliGRAM(s) IntraMuscular once  insulin lispro (ADMELOG) corrective regimen sliding scale   SubCutaneous Before meals and at bedtime  lisinopril 10 milliGRAM(s) Oral daily  metoprolol tartrate 25 milliGRAM(s) Oral every 8 hours  oxyCODONE    IR 5 milliGRAM(s) Oral every 4 hours PRN  oxyCODONE    IR 10 milliGRAM(s) Oral every 4 hours PRN  pantoprazole    Tablet 40 milliGRAM(s) Oral before breakfast  polyethylene glycol 3350 17 Gram(s) Oral daily  senna 2 Tablet(s) Oral at bedtime  sodium chloride 0.9%. 1000 milliLiter(s) IV Continuous <Continuous>  spironolactone 25 milliGRAM(s) Oral daily                    Physical Therapy Rec:   Home  [  ]   Home w/ PT  [  ]  Rehab  [  ]  Discussed with Cardiothoracic Team at AM rounds.

## 2022-07-11 NOTE — PROGRESS NOTE ADULT - PROBLEM SELECTOR PLAN 4
Martinez Rodríguez started  ERP protocol

## 2022-07-11 NOTE — PROGRESS NOTE ADULT - PROBLEM SELECTOR PLAN 2
Continue with Home meds  Amlodipine 10mg QD  Lisinopril Held for OR tomorrow  Start BB.
A1C 5.9  FS qAC and qHS  Diabetic Diet  c/w ISS qHS/qAC

## 2022-07-11 NOTE — PROGRESS NOTE ADULT - PROVIDER SPECIALTY LIST ADULT
Critical Care
CT Surgery
Critical Care
CT Surgery

## 2022-07-12 ENCOUNTER — TRANSCRIPTION ENCOUNTER (OUTPATIENT)
Age: 72
End: 2022-07-12

## 2022-07-12 VITALS — WEIGHT: 161.16 LBS

## 2022-07-12 PROBLEM — I10 ESSENTIAL (PRIMARY) HYPERTENSION: Chronic | Status: ACTIVE | Noted: 2022-07-06

## 2022-07-12 PROBLEM — L80 VITILIGO: Chronic | Status: ACTIVE | Noted: 2022-07-06

## 2022-07-12 PROBLEM — E11.9 TYPE 2 DIABETES MELLITUS WITHOUT COMPLICATIONS: Chronic | Status: ACTIVE | Noted: 2022-07-06

## 2022-07-12 PROBLEM — E78.5 HYPERLIPIDEMIA, UNSPECIFIED: Chronic | Status: ACTIVE | Noted: 2022-07-06

## 2022-07-12 PROBLEM — Z87.891 PERSONAL HISTORY OF NICOTINE DEPENDENCE: Chronic | Status: ACTIVE | Noted: 2022-07-06

## 2022-07-12 LAB
ALBUMIN SERPL ELPH-MCNC: 3.3 G/DL — SIGNIFICANT CHANGE UP (ref 3.3–5)
ALP SERPL-CCNC: 59 U/L — SIGNIFICANT CHANGE UP (ref 40–120)
ALT FLD-CCNC: 46 U/L — HIGH (ref 10–45)
ANION GAP SERPL CALC-SCNC: 12 MMOL/L — SIGNIFICANT CHANGE UP (ref 5–17)
AST SERPL-CCNC: 32 U/L — SIGNIFICANT CHANGE UP (ref 10–40)
BASOPHILS # BLD AUTO: 0.04 K/UL — SIGNIFICANT CHANGE UP (ref 0–0.2)
BASOPHILS NFR BLD AUTO: 0.4 % — SIGNIFICANT CHANGE UP (ref 0–2)
BILIRUB SERPL-MCNC: 0.8 MG/DL — SIGNIFICANT CHANGE UP (ref 0.2–1.2)
BUN SERPL-MCNC: 21 MG/DL — SIGNIFICANT CHANGE UP (ref 7–23)
CALCIUM SERPL-MCNC: 8.3 MG/DL — LOW (ref 8.4–10.5)
CHLORIDE SERPL-SCNC: 103 MMOL/L — SIGNIFICANT CHANGE UP (ref 96–108)
CO2 SERPL-SCNC: 24 MMOL/L — SIGNIFICANT CHANGE UP (ref 22–31)
CREAT SERPL-MCNC: 0.85 MG/DL — SIGNIFICANT CHANGE UP (ref 0.5–1.3)
EGFR: 93 ML/MIN/1.73M2 — SIGNIFICANT CHANGE UP
EOSINOPHIL # BLD AUTO: 0.32 K/UL — SIGNIFICANT CHANGE UP (ref 0–0.5)
EOSINOPHIL NFR BLD AUTO: 3.2 % — SIGNIFICANT CHANGE UP (ref 0–6)
GLUCOSE BLDC GLUCOMTR-MCNC: 114 MG/DL — HIGH (ref 70–99)
GLUCOSE BLDC GLUCOMTR-MCNC: 93 MG/DL — SIGNIFICANT CHANGE UP (ref 70–99)
GLUCOSE SERPL-MCNC: 108 MG/DL — HIGH (ref 70–99)
HCT VFR BLD CALC: 29.9 % — LOW (ref 39–50)
HGB BLD-MCNC: 9.9 G/DL — LOW (ref 13–17)
IMM GRANULOCYTES NFR BLD AUTO: 1.2 % — SIGNIFICANT CHANGE UP (ref 0–1.5)
LYMPHOCYTES # BLD AUTO: 1.59 K/UL — SIGNIFICANT CHANGE UP (ref 1–3.3)
LYMPHOCYTES # BLD AUTO: 16 % — SIGNIFICANT CHANGE UP (ref 13–44)
MAGNESIUM SERPL-MCNC: 2 MG/DL — SIGNIFICANT CHANGE UP (ref 1.6–2.6)
MCHC RBC-ENTMCNC: 27.9 PG — SIGNIFICANT CHANGE UP (ref 27–34)
MCHC RBC-ENTMCNC: 33.1 GM/DL — SIGNIFICANT CHANGE UP (ref 32–36)
MCV RBC AUTO: 84.2 FL — SIGNIFICANT CHANGE UP (ref 80–100)
MONOCYTES # BLD AUTO: 1.62 K/UL — HIGH (ref 0–0.9)
MONOCYTES NFR BLD AUTO: 16.3 % — HIGH (ref 2–14)
NEUTROPHILS # BLD AUTO: 6.25 K/UL — SIGNIFICANT CHANGE UP (ref 1.8–7.4)
NEUTROPHILS NFR BLD AUTO: 62.9 % — SIGNIFICANT CHANGE UP (ref 43–77)
NRBC # BLD: 0 /100 WBCS — SIGNIFICANT CHANGE UP (ref 0–0)
PHOSPHATE SERPL-MCNC: 3.1 MG/DL — SIGNIFICANT CHANGE UP (ref 2.5–4.5)
PLATELET # BLD AUTO: 221 K/UL — SIGNIFICANT CHANGE UP (ref 150–400)
POTASSIUM SERPL-MCNC: 3.9 MMOL/L — SIGNIFICANT CHANGE UP (ref 3.5–5.3)
POTASSIUM SERPL-SCNC: 3.9 MMOL/L — SIGNIFICANT CHANGE UP (ref 3.5–5.3)
PROT SERPL-MCNC: 6.1 G/DL — SIGNIFICANT CHANGE UP (ref 6–8.3)
RBC # BLD: 3.55 M/UL — LOW (ref 4.2–5.8)
RBC # FLD: 13.6 % — SIGNIFICANT CHANGE UP (ref 10.3–14.5)
SODIUM SERPL-SCNC: 139 MMOL/L — SIGNIFICANT CHANGE UP (ref 135–145)
WBC # BLD: 9.94 K/UL — SIGNIFICANT CHANGE UP (ref 3.8–10.5)
WBC # FLD AUTO: 9.94 K/UL — SIGNIFICANT CHANGE UP (ref 3.8–10.5)

## 2022-07-12 PROCEDURE — 86965 POOLING BLOOD PLATELETS: CPT

## 2022-07-12 PROCEDURE — C8929: CPT

## 2022-07-12 PROCEDURE — 85014 HEMATOCRIT: CPT

## 2022-07-12 PROCEDURE — 83735 ASSAY OF MAGNESIUM: CPT

## 2022-07-12 PROCEDURE — 82803 BLOOD GASES ANY COMBINATION: CPT

## 2022-07-12 PROCEDURE — 83520 IMMUNOASSAY QUANT NOS NONAB: CPT

## 2022-07-12 PROCEDURE — 85730 THROMBOPLASTIN TIME PARTIAL: CPT

## 2022-07-12 PROCEDURE — 86901 BLOOD TYPING SEROLOGIC RH(D): CPT

## 2022-07-12 PROCEDURE — 85610 PROTHROMBIN TIME: CPT

## 2022-07-12 PROCEDURE — 87641 MR-STAPH DNA AMP PROBE: CPT

## 2022-07-12 PROCEDURE — 82947 ASSAY GLUCOSE BLOOD QUANT: CPT

## 2022-07-12 PROCEDURE — 97162 PT EVAL MOD COMPLEX 30 MIN: CPT

## 2022-07-12 PROCEDURE — 82553 CREATINE MB FRACTION: CPT

## 2022-07-12 PROCEDURE — U0005: CPT

## 2022-07-12 PROCEDURE — 86900 BLOOD TYPING SEROLOGIC ABO: CPT

## 2022-07-12 PROCEDURE — 83880 ASSAY OF NATRIURETIC PEPTIDE: CPT

## 2022-07-12 PROCEDURE — P9059: CPT

## 2022-07-12 PROCEDURE — 93010 ELECTROCARDIOGRAM REPORT: CPT

## 2022-07-12 PROCEDURE — 97116 GAIT TRAINING THERAPY: CPT

## 2022-07-12 PROCEDURE — 84132 ASSAY OF SERUM POTASSIUM: CPT

## 2022-07-12 PROCEDURE — 85025 COMPLETE CBC W/AUTO DIFF WBC: CPT

## 2022-07-12 PROCEDURE — 83036 HEMOGLOBIN GLYCOSYLATED A1C: CPT

## 2022-07-12 PROCEDURE — C1769: CPT

## 2022-07-12 PROCEDURE — 93306 TTE W/DOPPLER COMPLETE: CPT

## 2022-07-12 PROCEDURE — 82565 ASSAY OF CREATININE: CPT

## 2022-07-12 PROCEDURE — 85027 COMPLETE CBC AUTOMATED: CPT

## 2022-07-12 PROCEDURE — 97166 OT EVAL MOD COMPLEX 45 MIN: CPT

## 2022-07-12 PROCEDURE — 93880 EXTRACRANIAL BILAT STUDY: CPT

## 2022-07-12 PROCEDURE — 82962 GLUCOSE BLOOD TEST: CPT

## 2022-07-12 PROCEDURE — P9016: CPT

## 2022-07-12 PROCEDURE — 80048 BASIC METABOLIC PNL TOTAL CA: CPT

## 2022-07-12 PROCEDURE — 84295 ASSAY OF SERUM SODIUM: CPT

## 2022-07-12 PROCEDURE — C1894: CPT

## 2022-07-12 PROCEDURE — 93454 CORONARY ARTERY ANGIO S&I: CPT

## 2022-07-12 PROCEDURE — C1751: CPT

## 2022-07-12 PROCEDURE — 81003 URINALYSIS AUTO W/O SCOPE: CPT

## 2022-07-12 PROCEDURE — 84443 ASSAY THYROID STIM HORMONE: CPT

## 2022-07-12 PROCEDURE — 36415 COLL VENOUS BLD VENIPUNCTURE: CPT

## 2022-07-12 PROCEDURE — 82550 ASSAY OF CK (CPK): CPT

## 2022-07-12 PROCEDURE — C1887: CPT

## 2022-07-12 PROCEDURE — 87640 STAPH A DNA AMP PROBE: CPT

## 2022-07-12 PROCEDURE — 86891 AUTOLOGOUS BLOOD OP SALVAGE: CPT

## 2022-07-12 PROCEDURE — 84480 ASSAY TRIIODOTHYRONINE (T3): CPT

## 2022-07-12 PROCEDURE — 97110 THERAPEUTIC EXERCISES: CPT

## 2022-07-12 PROCEDURE — 80053 COMPREHEN METABOLIC PANEL: CPT

## 2022-07-12 PROCEDURE — P9100: CPT

## 2022-07-12 PROCEDURE — 71045 X-RAY EXAM CHEST 1 VIEW: CPT

## 2022-07-12 PROCEDURE — 82435 ASSAY OF BLOOD CHLORIDE: CPT

## 2022-07-12 PROCEDURE — 93005 ELECTROCARDIOGRAM TRACING: CPT

## 2022-07-12 PROCEDURE — 84100 ASSAY OF PHOSPHORUS: CPT

## 2022-07-12 PROCEDURE — P9012: CPT

## 2022-07-12 PROCEDURE — 82330 ASSAY OF CALCIUM: CPT

## 2022-07-12 PROCEDURE — 85018 HEMOGLOBIN: CPT

## 2022-07-12 PROCEDURE — 85384 FIBRINOGEN ACTIVITY: CPT

## 2022-07-12 PROCEDURE — 84484 ASSAY OF TROPONIN QUANT: CPT

## 2022-07-12 PROCEDURE — 85576 BLOOD PLATELET AGGREGATION: CPT

## 2022-07-12 PROCEDURE — 86923 COMPATIBILITY TEST ELECTRIC: CPT

## 2022-07-12 PROCEDURE — P9047: CPT

## 2022-07-12 PROCEDURE — C1889: CPT

## 2022-07-12 PROCEDURE — 86850 RBC ANTIBODY SCREEN: CPT

## 2022-07-12 PROCEDURE — 83605 ASSAY OF LACTIC ACID: CPT

## 2022-07-12 PROCEDURE — 36430 TRANSFUSION BLD/BLD COMPNT: CPT

## 2022-07-12 PROCEDURE — 84436 ASSAY OF TOTAL THYROXINE: CPT

## 2022-07-12 PROCEDURE — 94002 VENT MGMT INPAT INIT DAY: CPT

## 2022-07-12 PROCEDURE — U0003: CPT

## 2022-07-12 PROCEDURE — P9045: CPT

## 2022-07-12 PROCEDURE — 80061 LIPID PANEL: CPT

## 2022-07-12 PROCEDURE — 82248 BILIRUBIN DIRECT: CPT

## 2022-07-12 PROCEDURE — P9037: CPT

## 2022-07-12 RX ORDER — OXYCODONE HYDROCHLORIDE 5 MG/1
1 TABLET ORAL
Qty: 28 | Refills: 0
Start: 2022-07-12 | End: 2022-07-18

## 2022-07-12 RX ORDER — PANTOPRAZOLE SODIUM 20 MG/1
1 TABLET, DELAYED RELEASE ORAL
Qty: 7 | Refills: 0
Start: 2022-07-12 | End: 2022-07-18

## 2022-07-12 RX ORDER — METFORMIN HYDROCHLORIDE 850 MG/1
1 TABLET ORAL
Qty: 60 | Refills: 0
Start: 2022-07-12 | End: 2022-08-10

## 2022-07-12 RX ORDER — POLYETHYLENE GLYCOL 3350 17 G/17G
17 POWDER, FOR SOLUTION ORAL
Qty: 0 | Refills: 0 | DISCHARGE
Start: 2022-07-12

## 2022-07-12 RX ORDER — METFORMIN HYDROCHLORIDE 850 MG/1
1 TABLET ORAL
Qty: 0 | Refills: 0 | DISCHARGE

## 2022-07-12 RX ORDER — ASPIRIN/CALCIUM CARB/MAGNESIUM 324 MG
1 TABLET ORAL
Qty: 30 | Refills: 0
Start: 2022-07-12 | End: 2022-08-10

## 2022-07-12 RX ORDER — POTASSIUM CHLORIDE 20 MEQ
20 PACKET (EA) ORAL ONCE
Refills: 0 | Status: COMPLETED | OUTPATIENT
Start: 2022-07-12 | End: 2022-07-12

## 2022-07-12 RX ORDER — AMIODARONE HYDROCHLORIDE 400 MG/1
1 TABLET ORAL
Qty: 30 | Refills: 0
Start: 2022-07-12 | End: 2022-08-10

## 2022-07-12 RX ORDER — SENNA PLUS 8.6 MG/1
2 TABLET ORAL
Qty: 0 | Refills: 0 | DISCHARGE
Start: 2022-07-12

## 2022-07-12 RX ORDER — ASPIRIN/CALCIUM CARB/MAGNESIUM 324 MG
1 TABLET ORAL
Qty: 0 | Refills: 0 | DISCHARGE

## 2022-07-12 RX ORDER — METOPROLOL TARTRATE 50 MG
1 TABLET ORAL
Qty: 60 | Refills: 0
Start: 2022-07-12 | End: 2022-08-10

## 2022-07-12 RX ORDER — AMLODIPINE BESYLATE 2.5 MG/1
1 TABLET ORAL
Qty: 0 | Refills: 0 | DISCHARGE

## 2022-07-12 RX ORDER — ACETAMINOPHEN 500 MG
1 TABLET ORAL
Qty: 0 | Refills: 0 | DISCHARGE
Start: 2022-07-12

## 2022-07-12 RX ORDER — LISINOPRIL 2.5 MG/1
1 TABLET ORAL
Qty: 30 | Refills: 0
Start: 2022-07-12 | End: 2022-08-10

## 2022-07-12 RX ORDER — LISINOPRIL 2.5 MG/1
1 TABLET ORAL
Qty: 0 | Refills: 0 | DISCHARGE

## 2022-07-12 RX ORDER — SIMVASTATIN 20 MG/1
1 TABLET, FILM COATED ORAL
Qty: 30 | Refills: 0
Start: 2022-07-12 | End: 2022-08-10

## 2022-07-12 RX ORDER — SIMVASTATIN 20 MG/1
1 TABLET, FILM COATED ORAL
Qty: 0 | Refills: 0 | DISCHARGE

## 2022-07-12 RX ADMIN — AMIODARONE HYDROCHLORIDE 400 MILLIGRAM(S): 400 TABLET ORAL at 05:09

## 2022-07-12 RX ADMIN — PANTOPRAZOLE SODIUM 40 MILLIGRAM(S): 20 TABLET, DELAYED RELEASE ORAL at 05:10

## 2022-07-12 RX ADMIN — GABAPENTIN 100 MILLIGRAM(S): 400 CAPSULE ORAL at 05:09

## 2022-07-12 RX ADMIN — LISINOPRIL 10 MILLIGRAM(S): 2.5 TABLET ORAL at 05:08

## 2022-07-12 RX ADMIN — ENOXAPARIN SODIUM 40 MILLIGRAM(S): 100 INJECTION SUBCUTANEOUS at 05:10

## 2022-07-12 RX ADMIN — Medication 50 MILLIGRAM(S): at 05:10

## 2022-07-12 RX ADMIN — Medication 20 MILLIEQUIVALENT(S): at 08:46

## 2022-07-12 RX ADMIN — Medication 325 MILLIGRAM(S): at 12:54

## 2022-07-12 RX ADMIN — Medication 500 MILLIGRAM(S): at 05:08

## 2022-07-12 NOTE — DISCHARGE NOTE NURSING/CASE MANAGEMENT/SOCIAL WORK - NSDCPEFALRISK_GEN_ALL_CORE
For information on Fall & Injury Prevention, visit: [email or web address] OR  [email or web address] OR  https://www.cdc.gov/steadi/patient.html

## 2022-07-12 NOTE — DISCHARGE NOTE PROVIDER - CARE PROVIDER_API CALL
Stalin Montgomery (MD)  Surgery; Surgical Critical Care; Thoracic and Cardiac Surgery  [address] [place] [address]  Phone: [phone]  Fax: [phone]  Follow Up Time:

## 2022-07-12 NOTE — DISCHARGE NOTE PROVIDER - HOSPITAL COURSE
This is a 70 yo male PMH DM2 A1C 5.7, former smoker 30 pack yrs, bilat carotid stenosis,  HTN, HLD who presented to cardiology, Dr. BAUDILIO Briceno, for evaluation of left shoulder pain.  Denies chest pain/pressure, SOB/FONSECA, dizziness, diaphoresis, palpitations, nausea, vomiting, peripheral edema, recent weight gain, or syncope.  No implanted monitoring devices. NST 1/8/2021 EF 61% septal and apical moderate perfusion defect, moderate inferior defect.  Pt. presents asymptomatic for further evaluation and cardiac cath.  (06 Jul 2022 08:20)  Cardiac cath done showing Multivessel disease; CTS Dr Montgomery consulted for CABG eval.     7/6: Pt seen an examined at bedside, all labs and imaging reviewed by Dr. Montgomery; Plan for CABG tomorrow in AM 7/7, preop workup in progress  7/7 s/p C3L with Dr. Montgomery (LIMA-LAD, SVG-PDA, SVG-Circ)  7/8 IABP removed  7/9: Episode of Afib to 160s/unsustained Vtach, lido/amio bolus given, amio gtt dced this AM and PO load started, transferred to SDU at 2300 maintain primacor gtt, +void after bustos cath removal  7/10 VSS Primacor  d/c this am Lactate  to follow d/c left chest tube voiding dispotion to home with outpt cardiac rehab  7/11  Primacor weaned off yesterday, lopressor started this am .  Increase as tolerated.  d/c diuretic  7/12 VSS; RSR 60-80; discharge pt home today as per DR. Montgomery    This is a 72 yo male PMH DM2 A1C 5.7, former smoker 30 pack yrs, bilat carotid stenosis,  HTN, HLD who presented to cardiology, Dr. BAUDILIO Briceno, for evaluation of left shoulder pain.  Denies chest pain/pressure, SOB/FONSECA, dizziness, diaphoresis, palpitations, nausea, vomiting, peripheral edema, recent weight gain, or syncope.  No implanted monitoring devices. NST 1/8/2021 EF 61% septal and apical moderate perfusion defect, moderate inferior defect.  Pt. presents asymptomatic for further evaluation and cardiac cath.  (06 Jul 2022 08:20)  Cardiac cath done showing Multivessel disease; CTS Dr Montgomery consulted for CABG eval.     7/6: Pt seen an examined at bedside, all labs and imaging reviewed by Dr. Montgomery; Plan for CABG tomorrow in AM 7/7, preop workup in progress  7/7 s/p C3L with Dr. Montgomery (LIMA-LAD, SVG-PDA, SVG-Circ)  7/8 IABP removed  7/9: Episode of Afib to 160s/unsustained Vtach, lido/amio bolus given, amio gtt dced this AM and PO load started, transferred to SDU at 2300 maintain primacor gtt, +void after bustos cath removal  7/10 VSS Primacor  d/c this am Lactate  to follow d/c left chest tube voiding dispotion to home with outpt cardiac rehab  7/11  Primacor weaned off yesterday, lopressor started this am .  Increase as tolerated.  d/c diuretic  7/12 VSS; RSR 60-80; ekg qtc 547--> d/w DR. Montgomery- continue amio 200 qd; discharge pt home today as per DR. Montgomery

## 2022-07-12 NOTE — DISCHARGE NOTE PROVIDER - NSDCFUADDINST_GEN_ALL_CORE_FT
call Dr Montgomery for fever > 101  no driving until cleared by DR. Montgoemry  refer to cardiac surgery do and don't checklist   check blood sugar levels before meals and at bedtime- record levels

## 2022-07-12 NOTE — DISCHARGE NOTE PROVIDER - NSDCCPCAREPLAN_GEN_ALL_CORE_FT
PRINCIPAL DISCHARGE DIAGNOSIS  Diagnosis: S/P CABG x 3  Assessment and Plan of Treatment: shower daily  weigh yourself daily  continue current prescriptions as ordered  increase activity as tolerated   no added salt; low fat; low cholesterol, low salt, diabetic diet  check blood sugar levels before meals and at bedtime- record levels   follow up with Cardiologist in 1-2 weeks. Call to schedule appointment.  follow up with cardiac surgeon, DR. Montgomery on July 20th at 3:15 pm- call to confirm appointment. [phone]

## 2022-07-12 NOTE — DISCHARGE NOTE PROVIDER - NSDCMRMEDTOKEN_GEN_ALL_CORE_FT
amLODIPine 10 mg oral tablet: 1 tab(s) orally once a day  aspirin 81 mg oral delayed release tablet: 1 tab(s) orally once a day  lisinopril 40 mg oral tablet: 1 tab(s) orally once a day  metFORMIN 500 mg oral tablet: 1 tab(s) orally 2 times a day  simvastatin 40 mg oral tablet: 1 tab(s) orally once a day (at bedtime)   acetaminophen: 1 tab(s)- 325 mg  orally every 6 hours as needed for mild pain   amiodarone 200 mg oral tablet: 1 tab(s) orally once a day   aspirin 325 mg oral delayed release tablet: 1 tab(s) orally once a day  lisinopril 10 mg oral tablet: 1 tab(s) orally once a day  metFORMIN 500 mg oral tablet: 1 tab(s) orally 2 times a day  metoprolol tartrate 50 mg oral tablet: 1 tab(s) orally 2 times a day  oxyCODONE 5 mg oral tablet: 1 tab(s) orally every 6 hours, As Needed -Moderate Pain (4 - 6) MDD:4  pantoprazole 40 mg oral delayed release tablet: 1 tab(s) orally once a day (before a meal)  polyethylene glycol 3350 oral powder for reconstitution: 17 gram(s) orally once a day  senna leaf extract oral tablet: 2 tab(s) orally once a day (at bedtime)  simvastatin 40 mg oral tablet: 1 tab(s) orally once a day (at bedtime)

## 2022-07-12 NOTE — DISCHARGE NOTE PROVIDER - NSDCPNSUBOBJ_GEN_ALL_CORE
VSS  tele: RSR 60-80  neuro: alert and oriented-3   midsternal incision cdi héctor -sternum stable  lungs clear, RR easy unlabored  + bs nt nd + bm; neg n/v/d  LE: neg calf tenderness, neg LE edema, ppp bilaterally; rt svg site cdi héctor    Discharge pt home today 7/12 as per DR. Montgomery

## 2022-07-13 ENCOUNTER — APPOINTMENT (OUTPATIENT)
Dept: CARE COORDINATION | Facility: HOME HEALTH | Age: 72
End: 2022-07-13

## 2022-07-13 ENCOUNTER — NON-APPOINTMENT (OUTPATIENT)
Age: 72
End: 2022-07-13

## 2022-07-13 VITALS — RESPIRATION RATE: 17 BRPM

## 2022-07-13 PROCEDURE — 99024 POSTOP FOLLOW-UP VISIT: CPT

## 2022-07-13 RX ORDER — ASPIRIN 325 MG/1
325 TABLET ORAL DAILY
Refills: 0 | Status: ACTIVE | COMMUNITY

## 2022-07-13 RX ORDER — PANTOPRAZOLE SODIUM 40 MG/1
40 TABLET, DELAYED RELEASE ORAL DAILY
Refills: 0 | Status: ACTIVE | COMMUNITY

## 2022-07-13 RX ORDER — OXYCODONE HYDROCHLORIDE 5 MG/1
5 CAPSULE ORAL EVERY 6 HOURS
Refills: 0 | Status: ACTIVE | COMMUNITY

## 2022-07-13 RX ORDER — METOPROLOL TARTRATE 50 MG/1
50 TABLET, FILM COATED ORAL
Refills: 0 | Status: ACTIVE | COMMUNITY

## 2022-07-13 RX ORDER — METFORMIN HYDROCHLORIDE 500 MG/1
500 TABLET, COATED ORAL
Refills: 0 | Status: ACTIVE | COMMUNITY

## 2022-07-13 RX ORDER — SIMVASTATIN 40 MG/1
40 TABLET, FILM COATED ORAL
Refills: 0 | Status: ACTIVE | COMMUNITY

## 2022-07-13 RX ORDER — AMIODARONE HYDROCHLORIDE 200 MG/1
200 TABLET ORAL DAILY
Refills: 0 | Status: ACTIVE | COMMUNITY

## 2022-07-13 RX ORDER — LISINOPRIL 10 MG/1
10 TABLET ORAL DAILY
Refills: 5 | Status: ACTIVE | COMMUNITY

## 2022-07-13 NOTE — PHYSICAL EXAM
[] : no respiratory distress [Exaggerated Use Of Accessory Muscles For Inspiration] : no accessory muscle use [Chest Visual Inspection Thoracic Asymmetry] : no chest asymmetry [1+] : left 1+ [FreeTextEntry2] : as per pt. no LE edema noted  [FreeTextEntry1] : RLE SVG site héctor and well approximated. No erythema or drainage  noted

## 2022-07-13 NOTE — REASON FOR VISIT
[Follow-Up: _____] : a [unfilled] follow-up visit [Family Member] : family member [FreeTextEntry1] : Transitional Care Management

## 2022-07-13 NOTE — HISTORY OF PRESENT ILLNESS
[Home] : at home, [unfilled] , at the time of the visit. [Other Location: e.g. Home (Enter Location, City,State)___] : at [unfilled] [Family Member] : family member [Verbal consent obtained from patient] : the patient, [unfilled] [FreeTextEntry1] : This is a 72 yo male PMH DM2 A1C 5.7, former smoker 30 pack yrs, bilat carotid \par stenosis,  HTN, HLD who presented to cardiology, Dr. BAUDILIO Briceno, for evaluation of \par left shoulder pain.  Denies chest pain/pressure, SOB/FONSECA, dizziness, \par diaphoresis, palpitations, nausea, vomiting, peripheral edema, recent weight \par gain, or syncope.  No implanted monitoring devices. NST 1/8/2021 EF 61% septal \par and apical moderate perfusion defect, moderate inferior defect.  Pt. presents \par asymptomatic for further evaluation and cardiac cath.  (06 Jul 2022 08:20) \par Cardiac cath done showing Multivessel disease; CTS Dr Montgomery consulted for \par CABG eval. \par \par 7/6: Pt seen an examined at bedside, all labs and imaging reviewed by  \par Ulises; Plan for CABG tomorrow in AM 7/7, preop workup in progress \par 7/7 s/p C3L with Dr. Montgomery (LIMA-LAD, SVG-PDA, SVG-Circ) \par 7/8 IABP removed \par 7/9: Episode of Afib to 160s/unsustained Vtach, lido/amio bolus given, amio gtt \par dced this AM and PO load started, transferred to SDU at 2300 maintain primacor \par gtt, +void after bustos cath removal \par 7/10 VSS Primacor  d/c this am Lactate  to follow d/c left chest tube voiding \par dispotion to home with outpt cardiac rehab \par 7/11  Primacor weaned off yesterday, lopressor started this am .  Increase as \par tolerated. \par d/c diuretic \par 7/12 VSS; RSR 60-80; ekg qtc 547--> d/w DR. Montgomery- continue amio 200 qd; \par discharge pt home today as per DR. Montgomery \par \par 7/13 Initial visit completed via telehealth\par CC " I am doing ok"

## 2022-07-18 PROBLEM — I25.10 LEFT MAIN CORONARY ARTERY DISEASE: Status: ACTIVE | Noted: 2022-07-18

## 2022-07-18 PROBLEM — Z98.890 ON INTRA-AORTIC BALLOON PUMP ASSIST: Status: RESOLVED | Noted: 2022-07-18 | Resolved: 2022-07-18

## 2022-07-19 ENCOUNTER — APPOINTMENT (OUTPATIENT)
Dept: CARDIOTHORACIC SURGERY | Facility: CLINIC | Age: 72
End: 2022-07-19

## 2022-07-19 VITALS — WEIGHT: 152 LBS | HEIGHT: 66 IN | BODY MASS INDEX: 24.43 KG/M2

## 2022-07-19 VITALS
SYSTOLIC BLOOD PRESSURE: 145 MMHG | TEMPERATURE: 98 F | HEART RATE: 75 BPM | RESPIRATION RATE: 13 BRPM | OXYGEN SATURATION: 98 % | DIASTOLIC BLOOD PRESSURE: 65 MMHG

## 2022-07-19 DIAGNOSIS — Z95.1 PRESENCE OF AORTOCORONARY BYPASS GRAFT: ICD-10-CM

## 2022-07-19 DIAGNOSIS — Z09 ENCOUNTER FOR FOLLOW-UP EXAMINATION AFTER COMPLETED TREATMENT FOR CONDITIONS OTHER THAN MALIGNANT NEOPLASM: ICD-10-CM

## 2022-07-19 PROCEDURE — 99024 POSTOP FOLLOW-UP VISIT: CPT

## 2022-07-19 RX ORDER — LORATADINE 10 MG
17 TABLET,DISINTEGRATING ORAL DAILY
Qty: 1 | Refills: 1 | Status: ACTIVE | COMMUNITY

## 2022-07-19 RX ORDER — SENNOSIDES 8.6 MG TABLETS 8.6 MG/1
8.6 TABLET ORAL
Qty: 60 | Refills: 0 | Status: ACTIVE | COMMUNITY

## 2022-07-20 ENCOUNTER — APPOINTMENT (OUTPATIENT)
Dept: CARDIOTHORACIC SURGERY | Facility: CLINIC | Age: 72
End: 2022-07-20

## 2022-07-20 DIAGNOSIS — I25.10 ATHEROSCLEROTIC HEART DISEASE OF NATIVE CORONARY ARTERY W/OUT ANGINA PECTORIS: ICD-10-CM

## 2022-07-20 DIAGNOSIS — Z98.890 OTHER SPECIFIED POSTPROCEDURAL STATES: ICD-10-CM

## 2022-08-16 ENCOUNTER — TRANSCRIPTION ENCOUNTER (OUTPATIENT)
Age: 72
End: 2022-08-16

## 2022-08-18 ENCOUNTER — APPOINTMENT (OUTPATIENT)
Dept: CV DIAGNOSITCS | Facility: HOSPITAL | Age: 72
End: 2022-08-18

## 2022-08-19 ENCOUNTER — APPOINTMENT (OUTPATIENT)
Dept: CARDIOTHORACIC SURGERY | Facility: CLINIC | Age: 72
End: 2022-08-19

## 2023-02-02 NOTE — PHYSICAL THERAPY INITIAL EVALUATION ADULT - MANUAL MUSCLE TESTING RESULTS, REHAB EVAL
Report given to receiving BRO Moore, all questions answered.       Man Mejias RN  02/01/23 [id number] functionally at least 3+/5 t/o/grossly assessed due to
